# Patient Record
Sex: FEMALE | Race: WHITE | NOT HISPANIC OR LATINO | Employment: OTHER | URBAN - METROPOLITAN AREA
[De-identification: names, ages, dates, MRNs, and addresses within clinical notes are randomized per-mention and may not be internally consistent; named-entity substitution may affect disease eponyms.]

---

## 2017-04-28 ENCOUNTER — APPOINTMENT (OUTPATIENT)
Dept: WOMENS IMAGING | Facility: HOSPITAL | Age: 77
End: 2017-04-28

## 2017-04-28 PROCEDURE — MDREVIEWSP: Performed by: RADIOLOGY

## 2017-04-28 PROCEDURE — 77063 BREAST TOMOSYNTHESIS BI: CPT | Performed by: RADIOLOGY

## 2017-04-28 PROCEDURE — G0202 SCR MAMMO BI INCL CAD: HCPCS | Performed by: RADIOLOGY

## 2017-05-24 ENCOUNTER — TRANSCRIBE ORDERS (OUTPATIENT)
Dept: ADMINISTRATIVE | Facility: HOSPITAL | Age: 77
End: 2017-05-24

## 2017-05-24 DIAGNOSIS — D32.9 MENINGIOMA (HCC): Primary | ICD-10-CM

## 2017-06-01 ENCOUNTER — APPOINTMENT (OUTPATIENT)
Dept: MRI IMAGING | Facility: HOSPITAL | Age: 77
End: 2017-06-01
Attending: INTERNAL MEDICINE

## 2017-06-06 ENCOUNTER — HOSPITAL ENCOUNTER (OUTPATIENT)
Dept: MRI IMAGING | Facility: HOSPITAL | Age: 77
Discharge: HOME | End: 2017-06-06
Attending: INTERNAL MEDICINE

## 2017-06-09 ENCOUNTER — APPOINTMENT (OUTPATIENT)
Dept: MRI IMAGING | Facility: HOSPITAL | Age: 77
End: 2017-06-09
Attending: INTERNAL MEDICINE

## 2017-06-14 ENCOUNTER — HOSPITAL ENCOUNTER (OUTPATIENT)
Dept: MRI IMAGING | Facility: HOSPITAL | Age: 77
Discharge: HOME | End: 2017-06-14
Attending: INTERNAL MEDICINE | Admitting: INTERNAL MEDICINE

## 2017-06-14 DIAGNOSIS — D32.9 MENINGIOMA (HCC): ICD-10-CM

## 2017-06-14 PROCEDURE — 70553 MRI BRAIN STEM W/O & W/DYE: CPT

## 2017-06-14 PROCEDURE — 0 GADOBENATE DIMEGLUMINE 529 MG/ML SOLUTION: Performed by: INTERNAL MEDICINE

## 2017-06-14 PROCEDURE — A9577 INJ MULTIHANCE: HCPCS | Performed by: INTERNAL MEDICINE

## 2017-06-14 PROCEDURE — 82565 ASSAY OF CREATININE: CPT

## 2017-06-14 RX ADMIN — GADOBENATE DIMEGLUMINE 15 ML: 529 INJECTION, SOLUTION INTRAVENOUS at 11:15

## 2017-06-15 LAB — CREAT BLDA-MCNC: 0.8 MG/DL (ref 0.6–1.3)

## 2018-01-09 ENCOUNTER — TRANSCRIBE ORDERS (OUTPATIENT)
Dept: ADMINISTRATIVE | Facility: HOSPITAL | Age: 78
End: 2018-01-09

## 2018-01-09 DIAGNOSIS — D32.9 MENINGIOMA (HCC): Primary | ICD-10-CM

## 2018-01-18 ENCOUNTER — HOSPITAL ENCOUNTER (OUTPATIENT)
Dept: MRI IMAGING | Facility: HOSPITAL | Age: 78
Discharge: HOME | End: 2018-01-18
Attending: INTERNAL MEDICINE | Admitting: INTERNAL MEDICINE

## 2018-01-18 DIAGNOSIS — D32.9 MENINGIOMA (HCC): ICD-10-CM

## 2018-01-18 PROCEDURE — A9577 INJ MULTIHANCE: HCPCS | Performed by: INTERNAL MEDICINE

## 2018-01-18 PROCEDURE — 0 GADOBENATE DIMEGLUMINE 529 MG/ML SOLUTION: Performed by: INTERNAL MEDICINE

## 2018-01-18 PROCEDURE — 82565 ASSAY OF CREATININE: CPT

## 2018-01-18 PROCEDURE — 70553 MRI BRAIN STEM W/O & W/DYE: CPT

## 2018-01-18 RX ADMIN — GADOBENATE DIMEGLUMINE 15 ML: 529 INJECTION, SOLUTION INTRAVENOUS at 11:38

## 2018-01-19 LAB — CREAT BLDA-MCNC: 0.8 MG/DL (ref 0.6–1.3)

## 2018-05-01 ENCOUNTER — APPOINTMENT (OUTPATIENT)
Dept: WOMENS IMAGING | Facility: HOSPITAL | Age: 78
End: 2018-05-01

## 2018-05-01 PROCEDURE — 77067 SCR MAMMO BI INCL CAD: CPT | Performed by: RADIOLOGY

## 2018-05-01 PROCEDURE — MDREVIEWSP: Performed by: RADIOLOGY

## 2018-05-01 PROCEDURE — 77063 BREAST TOMOSYNTHESIS BI: CPT | Performed by: RADIOLOGY

## 2018-06-18 ENCOUNTER — TRANSCRIBE ORDERS (OUTPATIENT)
Dept: ADMINISTRATIVE | Facility: HOSPITAL | Age: 78
End: 2018-06-18

## 2018-06-18 DIAGNOSIS — M54.9 BACK PAIN, UNSPECIFIED BACK LOCATION, UNSPECIFIED BACK PAIN LATERALITY, UNSPECIFIED CHRONICITY: Primary | ICD-10-CM

## 2018-06-22 ENCOUNTER — APPOINTMENT (OUTPATIENT)
Dept: MRI IMAGING | Facility: HOSPITAL | Age: 78
End: 2018-06-22
Attending: INTERNAL MEDICINE

## 2018-06-29 ENCOUNTER — HOSPITAL ENCOUNTER (OUTPATIENT)
Dept: MRI IMAGING | Facility: HOSPITAL | Age: 78
Discharge: HOME | End: 2018-06-29
Attending: INTERNAL MEDICINE | Admitting: INTERNAL MEDICINE

## 2018-06-29 DIAGNOSIS — M54.9 BACK PAIN, UNSPECIFIED BACK LOCATION, UNSPECIFIED BACK PAIN LATERALITY, UNSPECIFIED CHRONICITY: ICD-10-CM

## 2018-06-29 PROCEDURE — 82565 ASSAY OF CREATININE: CPT

## 2018-06-29 PROCEDURE — 72158 MRI LUMBAR SPINE W/O & W/DYE: CPT

## 2018-06-29 PROCEDURE — 0 GADOBENATE DIMEGLUMINE 529 MG/ML SOLUTION: Performed by: INTERNAL MEDICINE

## 2018-06-29 PROCEDURE — A9577 INJ MULTIHANCE: HCPCS | Performed by: INTERNAL MEDICINE

## 2018-06-29 RX ADMIN — GADOBENATE DIMEGLUMINE 15 ML: 529 INJECTION, SOLUTION INTRAVENOUS at 10:38

## 2018-07-03 LAB — CREAT BLDA-MCNC: 0.8 MG/DL (ref 0.6–1.3)

## 2018-07-12 NOTE — PROGRESS NOTES
Subjective   Patient ID: Nai Rutledge is a 78 y.o. female is being seen for consultation today at the request of Miguel Quinones Jr.,* for back pain.    Today the patient is here with a new MRI of the lumbar spine. Today Ms. Rutledge reports back pain that radiates into the right leg down to the foot.    Back Pain   This is a chronic problem. The current episode started more than 1 year ago. The problem occurs daily. The pain is present in the lumbar spine. The pain radiates to the right thigh, right knee and right foot. Associated symptoms include bladder incontinence and leg pain. Pertinent negatives include no bowel incontinence, numbness, tingling or weakness.       The following portions of the patient's history were reviewed and updated as appropriate: allergies, current medications, past family history, past medical history, past social history, past surgical history and problem list.    Review of Systems   Gastrointestinal: Negative for bowel incontinence.   Genitourinary: Positive for bladder incontinence.   Musculoskeletal: Positive for back pain.   Neurological: Negative for tingling, weakness and numbness.   All other systems reviewed and are negative.    This very pleasant lady is with her . She has a history of having a meningioma removed by Dr. Mendoza in 2000 and has been on gabapentin for seizure management. She has had years of off-and-on low back pain and about a year ago began having some pain in her hip and radiating down the leg. Initially there was some concern about this being a hip problem, and she went to see an orthopedic surgeon who did a trochanteric block, which helped for a while, but the pain is now recurring. Her character of her pain is consistent with neurogenic claudication. Sitting down and lying down tend to make it better. Standing and walking tend to make it worse. She has a positive shopping cart sign. We discussed the nature of her problem and her MRI. I think while she  has some mild arthritis of the hip, it is the spinal stenosis that is the main issue here. I recommended a trial of lumbar epidural blocks and conservative treatment for now. If those work we will continue using them, and if not we can consider surgical treatment but we are not at that point yet.       Objective   Physical Exam   Constitutional: She is oriented to person, place, and time. She appears well-developed and well-nourished.   HENT:   Head: Normocephalic and atraumatic.   Eyes: Conjunctivae and EOM are normal. Pupils are equal, round, and reactive to light.   Fundoscopic exam:       The right eye shows no papilledema. The right eye shows venous pulsations.        The left eye shows no papilledema. The left eye shows venous pulsations.   Neck: Carotid bruit is not present.   Neurological: She is oriented to person, place, and time. She has a normal Finger-Nose-Finger Test and a normal Heel to Shin Test. Gait normal.   Reflex Scores:       Tricep reflexes are 2+ on the right side and 2+ on the left side.       Bicep reflexes are 2+ on the right side and 2+ on the left side.       Brachioradialis reflexes are 2+ on the right side and 2+ on the left side.       Patellar reflexes are 2+ on the right side and 2+ on the left side.       Achilles reflexes are 2+ on the right side and 2+ on the left side.  Psychiatric: Her speech is normal.     Neurologic Exam     Mental Status   Oriented to person, place, and time.   Registration of memory: Good recent and remote memory.   Attention: normal. Concentration: normal.   Speech: speech is normal   Level of consciousness: alert  Knowledge: consistent with education.     Cranial Nerves     CN II   Visual fields full to confrontation.   Visual acuity: normal    CN III, IV, VI   Pupils are equal, round, and reactive to light.  Extraocular motions are normal.     CN V   Facial sensation intact.   Right corneal reflex: normal  Left corneal reflex: normal    CN VII   Facial  expression full, symmetric.   Right facial weakness: none  Left facial weakness: none    CN VIII   Hearing: intact    CN IX, X   Palate: symmetric    CN XI   Right sternocleidomastoid strength: normal  Left sternocleidomastoid strength: normal    CN XII   Tongue: not atrophic  Tongue deviation: none    Motor Exam   Muscle bulk: normal  Right arm tone: normal  Left arm tone: normal  Right leg tone: normal  Left leg tone: normal    Strength   Strength 5/5 except as noted.     Sensory Exam   Light touch normal.     Gait, Coordination, and Reflexes     Gait  Gait: normal    Coordination   Finger to nose coordination: normal  Heel to shin coordination: normal    Reflexes   Right brachioradialis: 2+  Left brachioradialis: 2+  Right biceps: 2+  Left biceps: 2+  Right triceps: 2+  Left triceps: 2+  Right patellar: 2+  Left patellar: 2+  Right achilles: 2+  Left achilles: 2+  Right : 2+  Left : 2+      Assessment/Plan   Independent Review of Radiographic Studies:    I reviewed the lumbar MRI done 6/29/18 which does show multilevel degenerative disc disease and a previous surgery on the left at L5-S1 but probably the most important finding is some spinal stenosis at L4-L5 with root compression.  Agree with the report.      Medical Decision Making:    We'll go ahead and get the lumbar epidural blocks and have her come back in 2 months.  Hopefully that will control her symptoms but if all else fails surgical intervention can be considered later on.      Nai was seen today for back pain.    Diagnoses and all orders for this visit:    DDD (degenerative disc disease), lumbar  -     Epidural Block    Spinal stenosis of lumbar region with neurogenic claudication  -     Epidural Block      Return in about 2 months (around 9/16/2018).

## 2018-07-16 ENCOUNTER — OFFICE VISIT (OUTPATIENT)
Dept: NEUROSURGERY | Facility: CLINIC | Age: 78
End: 2018-07-16

## 2018-07-16 VITALS
HEIGHT: 64 IN | BODY MASS INDEX: 25.78 KG/M2 | DIASTOLIC BLOOD PRESSURE: 99 MMHG | WEIGHT: 151 LBS | HEART RATE: 92 BPM | SYSTOLIC BLOOD PRESSURE: 148 MMHG

## 2018-07-16 DIAGNOSIS — M51.36 DDD (DEGENERATIVE DISC DISEASE), LUMBAR: Primary | ICD-10-CM

## 2018-07-16 DIAGNOSIS — M48.062 SPINAL STENOSIS OF LUMBAR REGION WITH NEUROGENIC CLAUDICATION: ICD-10-CM

## 2018-07-16 PROCEDURE — 99204 OFFICE O/P NEW MOD 45 MIN: CPT | Performed by: NEUROLOGICAL SURGERY

## 2018-07-25 ENCOUNTER — ANESTHESIA (OUTPATIENT)
Dept: PAIN MEDICINE | Facility: HOSPITAL | Age: 78
End: 2018-07-25

## 2018-07-25 ENCOUNTER — HOSPITAL ENCOUNTER (OUTPATIENT)
Dept: GENERAL RADIOLOGY | Facility: HOSPITAL | Age: 78
Discharge: HOME | End: 2018-07-25

## 2018-07-25 ENCOUNTER — HOSPITAL ENCOUNTER (OUTPATIENT)
Dept: PAIN MEDICINE | Facility: HOSPITAL | Age: 78
Discharge: HOME | End: 2018-07-25
Attending: NEUROLOGICAL SURGERY | Admitting: ANESTHESIOLOGY

## 2018-07-25 ENCOUNTER — ANESTHESIA EVENT (OUTPATIENT)
Dept: PAIN MEDICINE | Facility: HOSPITAL | Age: 78
End: 2018-07-25

## 2018-07-25 VITALS
TEMPERATURE: 98 F | SYSTOLIC BLOOD PRESSURE: 118 MMHG | HEIGHT: 64 IN | BODY MASS INDEX: 26.8 KG/M2 | OXYGEN SATURATION: 94 % | DIASTOLIC BLOOD PRESSURE: 79 MMHG | RESPIRATION RATE: 16 BRPM | HEART RATE: 65 BPM | WEIGHT: 157 LBS

## 2018-07-25 DIAGNOSIS — R52 PAIN: ICD-10-CM

## 2018-07-25 DIAGNOSIS — M48.062 SPINAL STENOSIS OF LUMBAR REGION WITH NEUROGENIC CLAUDICATION: ICD-10-CM

## 2018-07-25 DIAGNOSIS — M51.36 DDD (DEGENERATIVE DISC DISEASE), LUMBAR: ICD-10-CM

## 2018-07-25 PROCEDURE — 77003 FLUOROGUIDE FOR SPINE INJECT: CPT

## 2018-07-25 PROCEDURE — 25010000002 METHYLPREDNISOLONE PER 80 MG: Performed by: ANESTHESIOLOGY

## 2018-07-25 PROCEDURE — C1755 CATHETER, INTRASPINAL: HCPCS

## 2018-07-25 RX ORDER — LIDOCAINE HYDROCHLORIDE 10 MG/ML
1 INJECTION, SOLUTION INFILTRATION; PERINEURAL ONCE AS NEEDED
Status: DISCONTINUED | OUTPATIENT
Start: 2018-07-25 | End: 2018-07-26 | Stop reason: HOSPADM

## 2018-07-25 RX ORDER — ASPIRIN 81 MG/1
81 TABLET ORAL DAILY
COMMUNITY
End: 2019-11-12

## 2018-07-25 RX ORDER — MIDAZOLAM HYDROCHLORIDE 1 MG/ML
1 INJECTION INTRAMUSCULAR; INTRAVENOUS AS NEEDED
Status: DISCONTINUED | OUTPATIENT
Start: 2018-07-25 | End: 2018-07-26 | Stop reason: HOSPADM

## 2018-07-25 RX ORDER — FENTANYL CITRATE 50 UG/ML
50 INJECTION, SOLUTION INTRAMUSCULAR; INTRAVENOUS AS NEEDED
Status: DISCONTINUED | OUTPATIENT
Start: 2018-07-25 | End: 2018-07-26 | Stop reason: HOSPADM

## 2018-07-25 RX ORDER — METHYLPREDNISOLONE ACETATE 80 MG/ML
80 INJECTION, SUSPENSION INTRA-ARTICULAR; INTRALESIONAL; INTRAMUSCULAR; SOFT TISSUE ONCE
Status: COMPLETED | OUTPATIENT
Start: 2018-07-25 | End: 2018-07-25

## 2018-07-25 RX ORDER — SODIUM CHLORIDE 0.9 % (FLUSH) 0.9 %
1-10 SYRINGE (ML) INJECTION AS NEEDED
Status: DISCONTINUED | OUTPATIENT
Start: 2018-07-25 | End: 2018-07-26 | Stop reason: HOSPADM

## 2018-07-25 RX ADMIN — METHYLPREDNISOLONE ACETATE 80 MG: 80 INJECTION, SUSPENSION INTRA-ARTICULAR; INTRALESIONAL; INTRAMUSCULAR; SOFT TISSUE at 10:12

## 2018-07-25 NOTE — ANESTHESIA PROCEDURE NOTES
PAIN Epidural block    Patient location during procedure: pain clinic  Start Time: 7/25/2018 10:10 AM  Stop Time: 7/25/2018 10:14 AM  Indication:at surgeon's request and procedure for pain  Performed By  Anesthesiologist: NOREEN BRASHER  Preanesthetic Checklist  Completed: patient identified, site marked, surgical consent, pre-op evaluation, timeout performed, IV checked, risks and benefits discussed and monitors and equipment checked  Additional Notes  Post-Op Diagnosis Codes:     * Lumbar degenerative disc disease (M51.36)     * Lumbar spinal stenosis (M48.061)  Prep:  Pt Position:prone  Sterile Tech:cap, gloves, mask and sterile barrier  Prep:chlorhexidine gluconate and isopropyl alcohol  Monitoring:blood pressure monitoring, continuous pulse oximetry and EKG  Procedure:  Sedation: no   Approach:left paramedian  Guidance: fluoroscopy  Location:lumbar  Level:5-6  Needle Type:Morelia  Needle Gauge:17 G  Cath Depth at skin:7 cm  Aspiration:negative  Test Dose:negative  Medications:  Depomedrol:80 mg  Preservative Free Saline:4mL    Post Assessment:  Dressing:occlusive dressing applied  Pt Tolerance:patient tolerated the procedure well with no apparent complications  Complications:no

## 2018-07-25 NOTE — INTERVAL H&P NOTE
[facility]  H&P reviewed. No changes since last visit.  Patient states   0% improvement since the last procedure/injection.    Diagnosis     * Lumbar degenerative disc disease [M51.36]     * Lumbar spinal stenosis [M48.061] First block. Right sided pain      Airway assessed since last visit. Airway class equals: 2.

## 2018-07-25 NOTE — H&P (VIEW-ONLY)
Subjective   Patient ID: Nai Rutledge is a 78 y.o. female is being seen for consultation today at the request of Miguel Quinones Jr.,* for back pain.    Today the patient is here with a new MRI of the lumbar spine. Today Ms. Rultedge reports back pain that radiates into the right leg down to the foot.    Back Pain   This is a chronic problem. The current episode started more than 1 year ago. The problem occurs daily. The pain is present in the lumbar spine. The pain radiates to the right thigh, right knee and right foot. Associated symptoms include bladder incontinence and leg pain. Pertinent negatives include no bowel incontinence, numbness, tingling or weakness.       The following portions of the patient's history were reviewed and updated as appropriate: allergies, current medications, past family history, past medical history, past social history, past surgical history and problem list.    Review of Systems   Gastrointestinal: Negative for bowel incontinence.   Genitourinary: Positive for bladder incontinence.   Musculoskeletal: Positive for back pain.   Neurological: Negative for tingling, weakness and numbness.   All other systems reviewed and are negative.    This very pleasant lady is with her . She has a history of having a meningioma removed by Dr. Mendoza in 2000 and has been on gabapentin for seizure management. She has had years of off-and-on low back pain and about a year ago began having some pain in her hip and radiating down the leg. Initially there was some concern about this being a hip problem, and she went to see an orthopedic surgeon who did a trochanteric block, which helped for a while, but the pain is now recurring. Her character of her pain is consistent with neurogenic claudication. Sitting down and lying down tend to make it better. Standing and walking tend to make it worse. She has a positive shopping cart sign. We discussed the nature of her problem and her MRI. I think while she  has some mild arthritis of the hip, it is the spinal stenosis that is the main issue here. I recommended a trial of lumbar epidural blocks and conservative treatment for now. If those work we will continue using them, and if not we can consider surgical treatment but we are not at that point yet.       Objective   Physical Exam   Constitutional: She is oriented to person, place, and time. She appears well-developed and well-nourished.   HENT:   Head: Normocephalic and atraumatic.   Eyes: Conjunctivae and EOM are normal. Pupils are equal, round, and reactive to light.   Fundoscopic exam:       The right eye shows no papilledema. The right eye shows venous pulsations.        The left eye shows no papilledema. The left eye shows venous pulsations.   Neck: Carotid bruit is not present.   Neurological: She is oriented to person, place, and time. She has a normal Finger-Nose-Finger Test and a normal Heel to Shin Test. Gait normal.   Reflex Scores:       Tricep reflexes are 2+ on the right side and 2+ on the left side.       Bicep reflexes are 2+ on the right side and 2+ on the left side.       Brachioradialis reflexes are 2+ on the right side and 2+ on the left side.       Patellar reflexes are 2+ on the right side and 2+ on the left side.       Achilles reflexes are 2+ on the right side and 2+ on the left side.  Psychiatric: Her speech is normal.     Neurologic Exam     Mental Status   Oriented to person, place, and time.   Registration of memory: Good recent and remote memory.   Attention: normal. Concentration: normal.   Speech: speech is normal   Level of consciousness: alert  Knowledge: consistent with education.     Cranial Nerves     CN II   Visual fields full to confrontation.   Visual acuity: normal    CN III, IV, VI   Pupils are equal, round, and reactive to light.  Extraocular motions are normal.     CN V   Facial sensation intact.   Right corneal reflex: normal  Left corneal reflex: normal    CN VII   Facial  expression full, symmetric.   Right facial weakness: none  Left facial weakness: none    CN VIII   Hearing: intact    CN IX, X   Palate: symmetric    CN XI   Right sternocleidomastoid strength: normal  Left sternocleidomastoid strength: normal    CN XII   Tongue: not atrophic  Tongue deviation: none    Motor Exam   Muscle bulk: normal  Right arm tone: normal  Left arm tone: normal  Right leg tone: normal  Left leg tone: normal    Strength   Strength 5/5 except as noted.     Sensory Exam   Light touch normal.     Gait, Coordination, and Reflexes     Gait  Gait: normal    Coordination   Finger to nose coordination: normal  Heel to shin coordination: normal    Reflexes   Right brachioradialis: 2+  Left brachioradialis: 2+  Right biceps: 2+  Left biceps: 2+  Right triceps: 2+  Left triceps: 2+  Right patellar: 2+  Left patellar: 2+  Right achilles: 2+  Left achilles: 2+  Right : 2+  Left : 2+      Assessment/Plan   Independent Review of Radiographic Studies:    I reviewed the lumbar MRI done 6/29/18 which does show multilevel degenerative disc disease and a previous surgery on the left at L5-S1 but probably the most important finding is some spinal stenosis at L4-L5 with root compression.  Agree with the report.      Medical Decision Making:    We'll go ahead and get the lumbar epidural blocks and have her come back in 2 months.  Hopefully that will control her symptoms but if all else fails surgical intervention can be considered later on.      Nai was seen today for back pain.    Diagnoses and all orders for this visit:    DDD (degenerative disc disease), lumbar  -     Epidural Block    Spinal stenosis of lumbar region with neurogenic claudication  -     Epidural Block      Return in about 2 months (around 9/16/2018).

## 2018-08-23 ENCOUNTER — APPOINTMENT (OUTPATIENT)
Dept: PAIN MEDICINE | Facility: HOSPITAL | Age: 78
End: 2018-08-23

## 2018-09-05 NOTE — PROGRESS NOTES
Subjective   Patient ID: Nai Rutledge is a 78 y.o. female is here today for follow-up on back pain.    At the last visit the patient reported back pain that radiates into the right leg down to the foot. The patient was referred to pain management for epidurals at the last visit.    Today the patient reports she has had 1 CLARENCE with good relief. Her pain has returned. Her pain is worse in the morning and as the day progresses it gets better. Her last CLARENCE was 7/25/2018. She is having some muscle pain in her back.    Back Pain   This is a chronic problem. The current episode started more than 1 year ago. The problem occurs daily. The problem has been gradually improving (Pain is returning) since onset. The pain is present in the lumbar spine. The pain radiates to the right thigh, right knee and right foot. The pain is at a severity of 5/10. The pain is mild. Worse during: Worse in morning. The symptoms are aggravated by sitting. Associated symptoms include bladder incontinence ( She has had this for a few months) and leg pain. Pertinent negatives include no bowel incontinence, chest pain, numbness, tingling or weakness. Treatments tried: CLARENCE x 1. The treatment provided mild relief.       The following portions of the patient's history were reviewed and updated as appropriate: allergies, current medications, past family history, past medical history, past social history, past surgical history and problem list.    Review of Systems   Respiratory: Negative for chest tightness and shortness of breath.    Cardiovascular: Negative for chest pain.   Gastrointestinal: Negative for bowel incontinence.   Genitourinary: Positive for bladder incontinence ( She has had this for a few months).   Musculoskeletal: Positive for back pain and myalgias.   Neurological: Negative for tingling, weakness and numbness.   All other systems reviewed and are negative.    This patient has known lumbar spinal stenosis with right buttock and leg pain  consistent with neurogenic claudication. She has had a previous history of a meningioma removed by Dr. Nava years ago and remains on gabapentin at 600 mg t.i.d. largely for seizures. She had 1 block in late October and it helped, but the pain has started to recur. She is aware of the Medicare guidelines for 4 blocks per year and would like to space them out in an attempt to avoid surgical intervention. That means she would need to wait until late October to get another block, which she is fine with doing, and repeating it again in late January. Only if the radicular pain becomes intractable and blocks do not help would I consider a lumbar decompression at L4-L5.       Objective   Physical Exam   Constitutional: She is oriented to person, place, and time. She appears well-developed and well-nourished.   HENT:   Head: Normocephalic and atraumatic.   Eyes: Pupils are equal, round, and reactive to light. Conjunctivae and EOM are normal.   Fundoscopic exam:       The right eye shows no papilledema. The right eye shows venous pulsations.        The left eye shows no papilledema. The left eye shows venous pulsations.   Neck: Carotid bruit is not present.   Neurological: She is oriented to person, place, and time. She has a normal Finger-Nose-Finger Test and a normal Heel to Shin Test. Gait normal.   Reflex Scores:       Tricep reflexes are 2+ on the right side and 2+ on the left side.       Bicep reflexes are 2+ on the right side and 2+ on the left side.       Brachioradialis reflexes are 2+ on the right side and 2+ on the left side.       Patellar reflexes are 2+ on the right side and 2+ on the left side.       Achilles reflexes are 2+ on the right side and 2+ on the left side.  Psychiatric: Her speech is normal.     Neurologic Exam     Mental Status   Oriented to person, place, and time.   Registration of memory: Good recent and remote memory.   Attention: normal. Concentration: normal.   Speech: speech is normal    Level of consciousness: alert  Knowledge: consistent with education.     Cranial Nerves     CN II   Visual fields full to confrontation.   Visual acuity: normal    CN III, IV, VI   Pupils are equal, round, and reactive to light.  Extraocular motions are normal.     CN V   Facial sensation intact.   Right corneal reflex: normal  Left corneal reflex: normal    CN VII   Facial expression full, symmetric.   Right facial weakness: none  Left facial weakness: none    CN VIII   Hearing: intact    CN IX, X   Palate: symmetric    CN XI   Right sternocleidomastoid strength: normal  Left sternocleidomastoid strength: normal    CN XII   Tongue: not atrophic  Tongue deviation: none    Motor Exam   Muscle bulk: normal  Right arm tone: normal  Left arm tone: normal  Right leg tone: normal  Left leg tone: normal    Strength   Strength 5/5 except as noted.     Sensory Exam   Light touch normal.     Gait, Coordination, and Reflexes     Gait  Gait: normal    Coordination   Finger to nose coordination: normal  Heel to shin coordination: normal    Reflexes   Right brachioradialis: 2+  Left brachioradialis: 2+  Right biceps: 2+  Left biceps: 2+  Right triceps: 2+  Left triceps: 2+  Right patellar: 2+  Left patellar: 2+  Right achilles: 2+  Left achilles: 2+  Right : 2+  Left : 2+      Assessment/Plan   Independent Review of Radiographic Studies:    I reviewed the lumbar MRI done June 29, 2018 which shows spinal stenosis at L4-L5 and previous surgery on the left at L5-S1.  Agree with the report.      Medical Decision Making:    We'll stick with nonoperative treatment.  She will get another block in late October and another block in late January and I'll see her in about 5 months.  If blocks don't help with the benefit is to brief, we can discuss surgical treatment but I don't think were at that point yet.      Nai was seen today for back pain.    Diagnoses and all orders for this visit:    DDD (degenerative disc disease),  lumbar  -     Epidural Block    Spinal stenosis of lumbar region with neurogenic claudication  -     Epidural Block      Return in about 5 months (around 2/17/2019).

## 2018-09-17 ENCOUNTER — OFFICE VISIT (OUTPATIENT)
Dept: NEUROSURGERY | Facility: CLINIC | Age: 78
End: 2018-09-17

## 2018-09-17 VITALS — SYSTOLIC BLOOD PRESSURE: 129 MMHG | HEART RATE: 90 BPM | DIASTOLIC BLOOD PRESSURE: 92 MMHG

## 2018-09-17 DIAGNOSIS — M51.36 DDD (DEGENERATIVE DISC DISEASE), LUMBAR: Primary | ICD-10-CM

## 2018-09-17 DIAGNOSIS — M48.062 SPINAL STENOSIS OF LUMBAR REGION WITH NEUROGENIC CLAUDICATION: ICD-10-CM

## 2018-09-17 PROCEDURE — 99214 OFFICE O/P EST MOD 30 MIN: CPT | Performed by: NEUROLOGICAL SURGERY

## 2018-11-06 ENCOUNTER — HOSPITAL ENCOUNTER (OUTPATIENT)
Dept: PAIN MEDICINE | Facility: HOSPITAL | Age: 78
Discharge: HOME | End: 2018-11-06
Admitting: ANESTHESIOLOGY

## 2018-11-06 ENCOUNTER — ANESTHESIA EVENT (OUTPATIENT)
Dept: PAIN MEDICINE | Facility: HOSPITAL | Age: 78
End: 2018-11-06

## 2018-11-06 ENCOUNTER — ANESTHESIA (OUTPATIENT)
Dept: PAIN MEDICINE | Facility: HOSPITAL | Age: 78
End: 2018-11-06

## 2018-11-06 ENCOUNTER — HOSPITAL ENCOUNTER (OUTPATIENT)
Dept: GENERAL RADIOLOGY | Facility: HOSPITAL | Age: 78
Discharge: HOME | End: 2018-11-06

## 2018-11-06 VITALS
TEMPERATURE: 98.1 F | HEIGHT: 64 IN | OXYGEN SATURATION: 95 % | DIASTOLIC BLOOD PRESSURE: 80 MMHG | BODY MASS INDEX: 27.31 KG/M2 | SYSTOLIC BLOOD PRESSURE: 118 MMHG | WEIGHT: 160 LBS | RESPIRATION RATE: 16 BRPM | HEART RATE: 60 BPM

## 2018-11-06 DIAGNOSIS — M48.062 SPINAL STENOSIS OF LUMBAR REGION WITH NEUROGENIC CLAUDICATION: ICD-10-CM

## 2018-11-06 DIAGNOSIS — M51.36 DDD (DEGENERATIVE DISC DISEASE), LUMBAR: Primary | ICD-10-CM

## 2018-11-06 DIAGNOSIS — R52 PAIN: ICD-10-CM

## 2018-11-06 PROCEDURE — 25010000002 METHYLPREDNISOLONE PER 80 MG: Performed by: ANESTHESIOLOGY

## 2018-11-06 PROCEDURE — 77003 FLUOROGUIDE FOR SPINE INJECT: CPT

## 2018-11-06 PROCEDURE — C1755 CATHETER, INTRASPINAL: HCPCS

## 2018-11-06 RX ORDER — MIDAZOLAM HYDROCHLORIDE 1 MG/ML
1 INJECTION INTRAMUSCULAR; INTRAVENOUS AS NEEDED
Status: DISCONTINUED | OUTPATIENT
Start: 2018-11-06 | End: 2018-11-07 | Stop reason: HOSPADM

## 2018-11-06 RX ORDER — LIDOCAINE HYDROCHLORIDE 10 MG/ML
1 INJECTION, SOLUTION INFILTRATION; PERINEURAL ONCE AS NEEDED
Status: DISCONTINUED | OUTPATIENT
Start: 2018-11-06 | End: 2018-11-07 | Stop reason: HOSPADM

## 2018-11-06 RX ORDER — SODIUM CHLORIDE 0.9 % (FLUSH) 0.9 %
1-10 SYRINGE (ML) INJECTION AS NEEDED
Status: DISCONTINUED | OUTPATIENT
Start: 2018-11-06 | End: 2018-11-07 | Stop reason: HOSPADM

## 2018-11-06 RX ORDER — METHYLPREDNISOLONE ACETATE 80 MG/ML
80 INJECTION, SUSPENSION INTRA-ARTICULAR; INTRALESIONAL; INTRAMUSCULAR; SOFT TISSUE ONCE
Status: COMPLETED | OUTPATIENT
Start: 2018-11-06 | End: 2018-11-06

## 2018-11-06 RX ORDER — FENTANYL CITRATE 50 UG/ML
50 INJECTION, SOLUTION INTRAMUSCULAR; INTRAVENOUS AS NEEDED
Status: DISCONTINUED | OUTPATIENT
Start: 2018-11-06 | End: 2018-11-07 | Stop reason: HOSPADM

## 2018-11-06 RX ADMIN — METHYLPREDNISOLONE ACETATE 80 MG: 80 INJECTION, SUSPENSION INTRA-ARTICULAR; INTRALESIONAL; INTRAMUSCULAR; SOFT TISSUE at 13:05

## 2018-11-06 NOTE — ANESTHESIA PROCEDURE NOTES
PAIN Epidural block    Pre-sedation assessment completed: 11/6/2018 12:54 PM    Patient reassessed immediately prior to procedure    Patient location during procedure: pain clinic  Indication:procedure for pain  Performed By  Anesthesiologist: LUISITO LÓPEZ  Preanesthetic Checklist  Completed: patient identified, site marked, surgical consent, pre-op evaluation, timeout performed, IV checked, risks and benefits discussed and monitors and equipment checked  Additional Notes  Post-Op Diagnosis Codes:     * Lumbar degenerative disc disease (M51.36)     * Lumbar spinal stenosis (M48.061)  Performed under fluoroscopic guidance  Prep:  Pt Position:prone  Sterile Tech:cap, gloves, mask and sterile barrier  Prep:chlorhexidine gluconate and isopropyl alcohol  Monitoring:blood pressure monitoring, continuous pulse oximetry and EKG  Procedure:  Sedation: no   Approach:right paramedian  Guidance: fluoroscopy  Location:lumbar (Intralaminar)  Interspace: L5-S1, Unable to find loss of resistance at L4-5.  Needle Type:Tuohy  Needle Gauge:20  Aspiration:negative  Medications:  Depomedrol:80  Preservative Free Saline:4mL  Comments:No contrast due to allergies.  Post Assessment:  Post-procedure: Band-aid.  Pt Tolerance:patient tolerated the procedure well with no apparent complications  Complications:no

## 2018-11-06 NOTE — H&P
[facility]    History and Physical    Patient Name: Nai Rutledge  :  1940  MRN:  [id number]  Date of Admission: 2018    Subjective     Patient is a 78 y.o. female presents with chief complaint of chronic low back and leg: right pain.  She has had one epidural in this series and notes almost 80% improvement of the leg avelina, less improvement of the back pain. .  She has a history of surgery at L5-S1 and stenosis worse at L4-5.    The following portions of the patients history were reviewed and updated as appropriate: current medications, allergies, past medical history, past surgical history, past family history, past social history and problem list                Objective     Past Medical History:   Past Medical History:   Diagnosis Date   • Back pain    • Cataract    • Hyperlipidemia    • Low back pain    • Meningioma (CMS/HCC)    • Seizures (CMS/HCC)    • Stroke (CMS/HCC)      Past Surgical History:   Past Surgical History:   Procedure Laterality Date   • BRAIN SURGERY      MENINGIOMA, BLEEDING, SEIZURE, STOKE   • BREAST SURGERY     • CATARACT EXTRACTION       AND    • EYE SURGERY Right     MACULAR HOLE IN RETINA     Family History:   Family History   Problem Relation Age of Onset   • No Known Problems Mother    • Heart disease Father    • Lung cancer Sister      Social History:   Social History   Substance Use Topics   • Smoking status: Never Smoker   • Smokeless tobacco: Never Used   • Alcohol use 6.0 oz/week     10 Glasses of wine per week       Vital Signs Range for the last 24 hours  Temperature: Temp:  [36.7 °C (98.1 °F)] 36.7 °C (98.1 °F)   Temp Source: Temp src: Oral   BP: BP: (146)/(95) 146/95   Pulse: Heart Rate:  [5] 5   Respirations: Resp:  [16] 16   SPO2: SpO2:  [94 %] 94 %   O2 Amount (l/min):     O2 Devices Device (Oxygen Therapy): room air   Weight: Weight:  [72.6 kg (160 lb)] 72.6 kg (160 lb)     Flowsheet Rows      First Filed Value   Admission Height  162.6  "cm (64\") Documented at 11/06/2018 1232   Admission Weight  72.6 kg (160 lb) Documented at 11/06/2018 1232          --------------------------------------------------------------------------------    Current Outpatient Prescriptions   Medication Sig Dispense Refill   • atorvastatin (LIPITOR) 20 MG tablet Take 20 mg by mouth Daily.     • Cholecalciferol (VITAMIN D3) 2000 units tablet Take  by mouth.     • gabapentin (NEURONTIN) 600 MG tablet Take 600 mg by mouth 3 (Three) Times a Day.     • Multiple Vitamins-Minerals (MULTIVITAMIN WITH MINERALS) tablet tablet Take 1 tablet by mouth Daily.     • vitamin B-12 (CYANOCOBALAMIN) 1000 MCG tablet Take 1,000 mcg by mouth Daily.     • aspirin 81 MG EC tablet Take 81 mg by mouth Daily.     • Omega-3 Fatty Acids (FISH OIL) 1000 MG capsule capsule Take  by mouth Daily With Breakfast.       No current facility-administered medications for this encounter.        --------------------------------------------------------------------------------  Assessment/Plan      Anesthesia Evaluation                  Airway   Mallampati: II  Dental - normal exam     Pulmonary    Cardiovascular - normal exam        Neuro/Psych- neuro exam normal  (-) strength not normal in all 4 extremities, left straight leg raise test, right straight leg raise test  GI/Hepatic/Renal/Endo      Musculoskeletal     Abdominal    Substance History      OB/GYN          Other                   Diagnosis and Plan    Treatment Plan  ASA 2   Patient has had previous injection/procedure with % improvement.   Procedures: Lumbar Epidural Steroid Injection(LESI), With fluoroscopy,       Anesthetic plan and risks discussed with patient.          Diagnosis     * Lumbar degenerative disc disease [M51.36]     * Lumbar spinal stenosis [M48.061]                    "

## 2019-01-23 ENCOUNTER — TRANSCRIBE ORDERS (OUTPATIENT)
Dept: ADMINISTRATIVE | Facility: HOSPITAL | Age: 79
End: 2019-01-23

## 2019-01-23 DIAGNOSIS — D32.9 MENINGIOMA (HCC): Primary | ICD-10-CM

## 2019-02-06 ENCOUNTER — HOSPITAL ENCOUNTER (OUTPATIENT)
Dept: MRI IMAGING | Facility: HOSPITAL | Age: 79
Discharge: HOME | End: 2019-02-06
Attending: INTERNAL MEDICINE | Admitting: INTERNAL MEDICINE

## 2019-02-06 DIAGNOSIS — D32.9 MENINGIOMA (HCC): ICD-10-CM

## 2019-02-06 LAB — CREAT BLDA-MCNC: 0.7 MG/DL (ref 0.6–1.3)

## 2019-02-06 PROCEDURE — 82565 ASSAY OF CREATININE: CPT

## 2019-02-06 PROCEDURE — 0 GADOBENATE DIMEGLUMINE 529 MG/ML SOLUTION: Performed by: INTERNAL MEDICINE

## 2019-02-06 PROCEDURE — A9577 INJ MULTIHANCE: HCPCS | Performed by: INTERNAL MEDICINE

## 2019-02-06 PROCEDURE — 70553 MRI BRAIN STEM W/O & W/DYE: CPT

## 2019-02-06 RX ADMIN — GADOBENATE DIMEGLUMINE 15 ML: 529 INJECTION, SOLUTION INTRAVENOUS at 11:20

## 2019-02-12 NOTE — PROGRESS NOTES
Subjective   Patient ID: Nai Rutledge is a 78 y.o. female is here today for follow-up after CLARENCE x 1 which has helped.  She is scheduled for her next one in mid March.  Patient was last seen 9.17.18 for back and right leg pain.    She is currently having intermittent mild to moderate low back pain with prolonged walking.  No leg pain or weakness and no N/T.  She is taking Gabapentin 600mg TID.        Back Pain   The problem occurs intermittently. The problem has been gradually improving since onset. The pain is present in the lumbar spine. The pain does not radiate. The pain is at a severity of 3/10. The pain is mild. Pertinent negatives include no leg pain, numbness, tingling or weakness.       The following portions of the patient's history were reviewed and updated as appropriate: allergies, current medications, past family history, past medical history, past social history, past surgical history and problem list.    Review of Systems   Musculoskeletal: Positive for back pain. Negative for arthralgias.   Neurological: Negative for tingling, weakness and numbness.   All other systems reviewed and are negative.    It has been about 5 months since I have seen her. She had a block in 11/2018 and it seemed to help her. She has back and right leg pain from spinal stenosis. The block actually helped her leg pain and what is bothering her most is the residual back pain. She had not been utilizing her membership at [facility] so I told her to go back and get in the therapy pool and to walk and do some mild exercises, including the recumbent bicycle and maybe even the seated elliptical . She said her back tends to bother her when she is standing upright for a long time so I told her that she can use a Warm 'N Form brace which I gave her a prescription for, when she anticipates being up and about. We will schedule the epidural blocks in 06/2019 and 09/2019, and have her come back after that. Again, we are trying to  avoid surgery if at all possible for her spinal stenosis.          Objective   Physical Exam   Constitutional: She is oriented to person, place, and time. She appears well-developed and well-nourished.   HENT:   Head: Normocephalic and atraumatic.   Eyes: Conjunctivae and EOM are normal. Pupils are equal, round, and reactive to light.   Fundoscopic exam:       The right eye shows no papilledema. The right eye shows venous pulsations.        The left eye shows no papilledema. The left eye shows venous pulsations.   Neck: Carotid bruit is not present.   Neurological: She is oriented to person, place, and time. She has a normal Finger-Nose-Finger Test and a normal Heel to Shin Test. Gait normal.   Reflex Scores:       Tricep reflexes are 2+ on the right side and 2+ on the left side.       Bicep reflexes are 2+ on the right side and 2+ on the left side.       Brachioradialis reflexes are 2+ on the right side and 2+ on the left side.       Patellar reflexes are 2+ on the right side and 2+ on the left side.       Achilles reflexes are 2+ on the right side and 2+ on the left side.  Psychiatric: Her speech is normal.     Neurologic Exam     Mental Status   Oriented to person, place, and time.   Registration of memory: Good recent and remote memory.   Attention: normal. Concentration: normal.   Speech: speech is normal   Level of consciousness: alert  Knowledge: consistent with education.     Cranial Nerves     CN II   Visual fields full to confrontation.   Visual acuity: normal    CN III, IV, VI   Pupils are equal, round, and reactive to light.  Extraocular motions are normal.     CN V   Facial sensation intact.   Right corneal reflex: normal  Left corneal reflex: normal    CN VII   Facial expression full, symmetric.   Right facial weakness: none  Left facial weakness: none    CN VIII   Hearing: intact    CN IX, X   Palate: symmetric    CN XI   Right sternocleidomastoid strength: normal  Left sternocleidomastoid strength:  normal    CN XII   Tongue: not atrophic  Tongue deviation: none    Motor Exam   Muscle bulk: normal  Right arm tone: normal  Left arm tone: normal  Right leg tone: normal  Left leg tone: normal    Strength   Strength 5/5 except as noted.     Sensory Exam   Light touch normal.     Gait, Coordination, and Reflexes     Gait  Gait: normal    Coordination   Finger to nose coordination: normal  Heel to shin coordination: normal    Reflexes   Right brachioradialis: 2+  Left brachioradialis: 2+  Right biceps: 2+  Left biceps: 2+  Right triceps: 2+  Left triceps: 2+  Right patellar: 2+  Left patellar: 2+  Right achilles: 2+  Left achilles: 2+  Right : 2+  Left : 2+      Assessment/Plan   Independent Review of Radiographic Studies:    Lumbar MRI done 6/29/18 shows spinal stenosis at L4-L5 with postoperative [place] the left at L5-S1.  Agree with the report.      Medical Decision Making:    We'll stick with nonoperative approach.  We'll get another block in June and another in September with a follow-up in 10 months.  I gave her prescription for a Warm 'n Form brace and told to go back to [facility] and do exercises in the therapeutic pool.      Nai was seen today for back pain.    Diagnoses and all orders for this visit:    DDD (degenerative disc disease), lumbar  -     Epidural Block    Spinal stenosis of lumbar region with neurogenic claudication  -     Epidural Block      Return in about 8 months (around 10/18/2019).

## 2019-02-18 ENCOUNTER — OFFICE VISIT (OUTPATIENT)
Dept: NEUROSURGERY | Facility: CLINIC | Age: 79
End: 2019-02-18

## 2019-02-18 VITALS
DIASTOLIC BLOOD PRESSURE: 90 MMHG | BODY MASS INDEX: 26.46 KG/M2 | WEIGHT: 155 LBS | SYSTOLIC BLOOD PRESSURE: 131 MMHG | HEART RATE: 80 BPM | HEIGHT: 64 IN

## 2019-02-18 DIAGNOSIS — M51.36 DDD (DEGENERATIVE DISC DISEASE), LUMBAR: Primary | ICD-10-CM

## 2019-02-18 DIAGNOSIS — M48.062 SPINAL STENOSIS OF LUMBAR REGION WITH NEUROGENIC CLAUDICATION: ICD-10-CM

## 2019-02-18 PROCEDURE — 99213 OFFICE O/P EST LOW 20 MIN: CPT | Performed by: NEUROLOGICAL SURGERY

## 2019-03-07 ENCOUNTER — TRANSCRIBE ORDERS (OUTPATIENT)
Dept: ADMINISTRATIVE | Facility: HOSPITAL | Age: 79
End: 2019-03-07

## 2019-03-07 DIAGNOSIS — I48.0 PAROXYSMAL ATRIAL FIBRILLATION (HCC): Primary | ICD-10-CM

## 2019-03-12 ENCOUNTER — HOSPITAL ENCOUNTER (OUTPATIENT)
Dept: CARDIOLOGY | Facility: HOSPITAL | Age: 79
Discharge: HOME | End: 2019-03-12
Admitting: INTERNAL MEDICINE

## 2019-03-12 DIAGNOSIS — I48.0 PAROXYSMAL ATRIAL FIBRILLATION (HCC): ICD-10-CM

## 2019-03-12 PROCEDURE — 0296T HC EXT ECG > 48HR TO 21 DAY RCRD W/CONECT INTL RCRD: CPT

## 2019-03-18 ENCOUNTER — ANESTHESIA (OUTPATIENT)
Dept: PAIN MEDICINE | Facility: HOSPITAL | Age: 79
End: 2019-03-18

## 2019-03-18 ENCOUNTER — ANESTHESIA EVENT (OUTPATIENT)
Dept: PAIN MEDICINE | Facility: HOSPITAL | Age: 79
End: 2019-03-18

## 2019-03-18 ENCOUNTER — HOSPITAL ENCOUNTER (OUTPATIENT)
Dept: GENERAL RADIOLOGY | Facility: HOSPITAL | Age: 79
Discharge: HOME | End: 2019-03-18

## 2019-03-18 ENCOUNTER — HOSPITAL ENCOUNTER (OUTPATIENT)
Dept: PAIN MEDICINE | Facility: HOSPITAL | Age: 79
Discharge: HOME | End: 2019-03-18
Admitting: ANESTHESIOLOGY

## 2019-03-18 VITALS
OXYGEN SATURATION: 94 % | HEART RATE: 59 BPM | SYSTOLIC BLOOD PRESSURE: 115 MMHG | TEMPERATURE: 97.5 F | RESPIRATION RATE: 16 BRPM | DIASTOLIC BLOOD PRESSURE: 67 MMHG

## 2019-03-18 DIAGNOSIS — M51.36 DDD (DEGENERATIVE DISC DISEASE), LUMBAR: Primary | ICD-10-CM

## 2019-03-18 DIAGNOSIS — R52 PAIN: ICD-10-CM

## 2019-03-18 DIAGNOSIS — M48.062 SPINAL STENOSIS OF LUMBAR REGION WITH NEUROGENIC CLAUDICATION: ICD-10-CM

## 2019-03-18 PROCEDURE — 25010000002 METHYLPREDNISOLONE PER 80 MG: Performed by: ANESTHESIOLOGY

## 2019-03-18 PROCEDURE — C1755 CATHETER, INTRASPINAL: HCPCS

## 2019-03-18 PROCEDURE — 77003 FLUOROGUIDE FOR SPINE INJECT: CPT

## 2019-03-18 RX ORDER — FENTANYL CITRATE 50 UG/ML
50 INJECTION, SOLUTION INTRAMUSCULAR; INTRAVENOUS AS NEEDED
Status: DISCONTINUED | OUTPATIENT
Start: 2019-03-18 | End: 2019-03-19 | Stop reason: HOSPADM

## 2019-03-18 RX ORDER — MIDAZOLAM HYDROCHLORIDE 1 MG/ML
1 INJECTION INTRAMUSCULAR; INTRAVENOUS AS NEEDED
Status: DISCONTINUED | OUTPATIENT
Start: 2019-03-18 | End: 2019-03-19 | Stop reason: HOSPADM

## 2019-03-18 RX ORDER — SODIUM CHLORIDE 0.9 % (FLUSH) 0.9 %
1-10 SYRINGE (ML) INJECTION AS NEEDED
Status: DISCONTINUED | OUTPATIENT
Start: 2019-03-18 | End: 2019-03-19 | Stop reason: HOSPADM

## 2019-03-18 RX ORDER — METHYLPREDNISOLONE ACETATE 80 MG/ML
80 INJECTION, SUSPENSION INTRA-ARTICULAR; INTRALESIONAL; INTRAMUSCULAR; SOFT TISSUE ONCE
Status: COMPLETED | OUTPATIENT
Start: 2019-03-18 | End: 2019-03-18

## 2019-03-18 RX ORDER — LIDOCAINE HYDROCHLORIDE 10 MG/ML
1 INJECTION, SOLUTION INFILTRATION; PERINEURAL ONCE AS NEEDED
Status: DISCONTINUED | OUTPATIENT
Start: 2019-03-18 | End: 2019-03-19 | Stop reason: HOSPADM

## 2019-03-18 RX ADMIN — METHYLPREDNISOLONE ACETATE 80 MG: 80 INJECTION, SUSPENSION INTRA-ARTICULAR; INTRALESIONAL; INTRAMUSCULAR; SOFT TISSUE at 11:19

## 2019-03-18 NOTE — H&P
[facility]    History and Physical    Patient Name: Nai Rutledge  :  1940  MRN:  [id number]  Date of Admission: 3/18/2019    Subjective     Patient is a 78 y.o. female presents with chief complaint of chronic low back and leg: right pain.  She has had two epidurals in this series, the last in November and notes almost 100% improvement of the leg pain, and at least 50% improvement of the back pain. .  She has a history of left sided surgery at L5-S1 and spinal stenosis worse at L4-5.    The following portions of the patients history were reviewed and updated as appropriate: current medications, allergies, past medical history, past surgical history, past family history, past social history and problem list                Objective     Past Medical History:   Past Medical History:   Diagnosis Date   • Back pain    • Cataract    • Hyperlipidemia    • Low back pain    • Meningioma (CMS/[facility])    • Seizures (CMS/[facility])    • Stroke (CMS/[facility])      Past Surgical History:   Past Surgical History:   Procedure Laterality Date   • BRAIN SURGERY      MENINGIOMA, BLEEDING, SEIZURE, STOKE   • BREAST SURGERY     • CATARACT EXTRACTION       AND    • EPIDURAL BLOCK     • EYE SURGERY Right     MACULAR HOLE IN RETINA     Family History:   Family History   Problem Relation Age of Onset   • No Known Problems Mother    • Heart disease Father    • Lung cancer Sister      Social History:   Social History     Tobacco Use   • Smoking status: Never Smoker   • Smokeless tobacco: Never Used   Substance Use Topics   • Alcohol use: Yes     Alcohol/week: 6.0 oz     Types: 10 Glasses of wine per week   • Drug use: No       Vital Signs Range for the last 24 hours  Temperature: Temp:  [36.4 °C (97.5 °F)] 36.4 °C (97.5 °F)   Temp Source: Temp src: Oral   BP: BP: (118)/(87) 118/87   Pulse: Heart Rate:  [75] 75   Respirations: Resp:  [16] 16   SPO2: SpO2:  [95 %] 95 %   O2 Amount (l/min):     O2 Devices Device (Oxygen  "Therapy): room air   Weight:       Flowsheet Rows      First Filed Value   Admission Height  162.6 cm (64\") Documented at 11/06/2018 1232   Admission Weight  72.6 kg (160 lb) Documented at 11/06/2018 1232          --------------------------------------------------------------------------------    Current Outpatient Medications   Medication Sig Dispense Refill   • atorvastatin (LIPITOR) 20 MG tablet Take 20 mg by mouth Daily.     • Cholecalciferol (VITAMIN D3) 2000 units tablet Take  by mouth.     • gabapentin (NEURONTIN) 600 MG tablet Take 600 mg by mouth 3 (Three) Times a Day.     • Multiple Vitamins-Minerals (MULTIVITAMIN WITH MINERALS) tablet tablet Take 1 tablet by mouth Daily.     • vitamin B-12 (CYANOCOBALAMIN) 1000 MCG tablet Take 1,000 mcg by mouth Daily.     • aspirin 81 MG EC tablet Take 81 mg by mouth Daily.     • Omega-3 Fatty Acids (FISH OIL) 1000 MG capsule capsule Take  by mouth Daily With Breakfast.       No current facility-administered medications for this encounter.        --------------------------------------------------------------------------------  Assessment/Plan      Anesthesia Evaluation                  Airway   Mallampati: II  Dental - normal exam     Pulmonary    Cardiovascular - normal exam        Neuro/Psych- neuro exam normal  (-) strength not normal in all 4 extremities, left straight leg raise test, right straight leg raise test  GI/Hepatic/Renal/Endo      Musculoskeletal     Abdominal    Substance History      OB/GYN          Other                   Diagnosis and Plan      Treatment Plan  ASA 2   Patient has had previous injection/procedure with % improvement.   Procedures: Lumbar Epidural Steroid Injection(LESI), With fluoroscopy,       Anesthetic plan and risks discussed with patient.          Diagnosis     * Lumbar degenerative disc disease [M51.36]     * Lumbar spinal stenosis [M48.061]                    "

## 2019-03-18 NOTE — ANESTHESIA PROCEDURE NOTES
PAIN Epidural block    Pre-sedation assessment completed: 3/18/2019 11:08 AM    Patient reassessed immediately prior to procedure    Patient location during procedure: pain clinic  Indication:procedure for pain  Performed By  Anesthesiologist: Alejo Caldwell MD  Preanesthetic Checklist  Completed: patient identified, site marked, surgical consent, pre-op evaluation, timeout performed, IV checked, risks and benefits discussed and monitors and equipment checked  Additional Notes  Post-Op Diagnosis Codes:     * Lumbar degenerative disc disease (M51.36)     * Lumbar spinal stenosis (M48.061)  Performed under fluoroscopic guidance  Prep:  Pt Position:prone  Sterile Tech:cap, gloves, mask and sterile barrier  Prep:chlorhexidine gluconate and isopropyl alcohol  Monitoring:blood pressure monitoring, continuous pulse oximetry and EKG  Procedure:Sedation: no     Approach:midline  Guidance: fluoroscopy  Location:lumbar (Intralaminar)  Level:4-5  Needle Type:Tuohy  Needle Gauge:20  Aspiration:negative  Medications:  Depomedrol:80  Preservative Free Saline:4mL  Comments:No contrast due to iodine allergy  Post Assessment:  Post-procedure: Band-aid.  Pt Tolerance:patient tolerated the procedure well with no apparent complications  Complications:no

## 2019-04-03 PROCEDURE — 0298T HOLTER MONITOR - 72 HOUR UP TO 21 DAY: CPT | Performed by: INTERNAL MEDICINE

## 2019-06-19 ENCOUNTER — OFFICE VISIT (OUTPATIENT)
Dept: CARDIOLOGY | Facility: CLINIC | Age: 79
End: 2019-06-19

## 2019-06-19 VITALS
SYSTOLIC BLOOD PRESSURE: 122 MMHG | WEIGHT: 154 LBS | HEIGHT: 64 IN | DIASTOLIC BLOOD PRESSURE: 84 MMHG | BODY MASS INDEX: 26.29 KG/M2

## 2019-06-19 DIAGNOSIS — I48.0 PAROXYSMAL ATRIAL FIBRILLATION (HCC): Primary | ICD-10-CM

## 2019-06-19 PROCEDURE — 99204 OFFICE O/P NEW MOD 45 MIN: CPT | Performed by: INTERNAL MEDICINE

## 2019-06-19 PROCEDURE — 93000 ELECTROCARDIOGRAM COMPLETE: CPT | Performed by: INTERNAL MEDICINE

## 2019-06-19 RX ORDER — ATENOLOL 25 MG/1
25 TABLET ORAL 2 TIMES DAILY
Refills: 0 | COMMUNITY
Start: 2019-05-30

## 2019-06-19 NOTE — PROGRESS NOTES
Date of Office Visit: 2019  Encounter Provider: Tree Marques MD  Place of Service: [facility] CARDIOLOGY  Patient Name: Nai Rutledge  : 1940    Subjective:     Encounter Date:2019      Patient ID: Nai Rutledge is a 78 y.o. female who has a cc of  Sent by jessica for AF on a zio.     She has a neuro deficit following a brain surgery for meningioma 20 years ago.       Symptoms --- She feels unusual beating in the left chest, lasting seconds    She did have an episode of longer lasting irreg beats and tachy and felt some near fainting. -- 15 minutes or so. Then Dr. JONES ordered a zio.     WHen she wore the zio which showed <1% AF and AF lasting 4 miinutes she can't recall having any symptoms.     Dr JONES has recently started on atenolol and no more long instances. Just skips.,     The patient had a good year.   No anginal chest pain,   No sig rico,   No soa,   No fainting,  No orthostasis.   No edema.   Exercise tolerance: no formal exercise.     There have been no hospital admission since the last visit.     There have been no bleeding events.       Past Medical History:   Diagnosis Date   • Back pain    • Cataract    • Hyperlipidemia    • Low back pain    • Meningioma (CMS/[facility])    • Seizures (CMS/[facility])    • Stroke (CMS/[facility])        Social History     Socioeconomic History   • Marital status:      Spouse name: Not on file   • Number of children: 2   • Years of education: BA   • Highest education level: Not on file   Occupational History   • Occupation: RETIRED   Tobacco Use   • Smoking status: Never Smoker   • Smokeless tobacco: Never Used   Substance and Sexual Activity   • Alcohol use: Yes     Alcohol/week: 6.0 oz     Types: 10 Glasses of wine per week   • Drug use: No   • Sexual activity: Defer   Social History Narrative    LIVES WITH SPOUSE       Review of Systems   Constitution: Negative for fever and night sweats.   HENT: Negative for ear pain and stridor.   "  Eyes: Negative for discharge and visual halos.   Cardiovascular: Negative for cyanosis.   Respiratory: Negative for hemoptysis and sputum production.    Hematologic/Lymphatic: Negative for adenopathy.   Skin: Negative for nail changes and unusual hair distribution.   Musculoskeletal: Positive for arthritis and back pain. Negative for gout and joint swelling.   Gastrointestinal: Negative for bowel incontinence and flatus.   Genitourinary: Negative for dysuria and flank pain.   Neurological: Negative for seizures and tremors.   Psychiatric/Behavioral: Negative for altered mental status. The patient is not nervous/anxious.             Objective:     Vitals:    06/19/19 1116   BP: 122/84   BP Location: Right arm   Patient Position: Sitting   Cuff Size: Large Adult   Weight: 69.9 kg (154 lb)   Height: 162.6 cm (64\")         Physical Exam   Constitutional: She is oriented to person, place, and time.   HENT:   Head: Normocephalic and atraumatic.   Eyes: Right eye exhibits no discharge. Left eye exhibits no discharge.   Neck: No JVD present. No thyromegaly present.   Cardiovascular: Normal rate and regular rhythm. Exam reveals no gallop and no friction rub.   No murmur heard.  Pulmonary/Chest: Effort normal and breath sounds normal. She has no rales.   Abdominal: Soft. Bowel sounds are normal. There is no tenderness.   Musculoskeletal: Normal range of motion. She exhibits no edema or deformity.   Neurological: She is alert and oriented to person, place, and time. She exhibits normal muscle tone.   Skin: Skin is warm and dry. No erythema.   Psychiatric: She has a normal mood and affect. Her behavior is normal. Thought content normal.         ECG 12 Lead  Date/Time: 6/19/2019 11:42 AM  Performed by: Tree Marques MD  Authorized by: Tree Marques MD   Comparison: compared with previous ECG   Similar to previous ECG  Rhythm: sinus rhythm  Rate: normal  Conduction: conduction normal  ST Segments: ST segments normal  T " Waves: T waves normal  QRS axis: normal    Clinical impression: normal ECG            Lab Review:       Assessment:          Diagnosis Plan   1. Paroxysmal atrial fibrillation (CMS/HCC)            Plan:     This is a tough call -- she had PAF w fast rates but very low burden (<1%) and quite short lived at longest being 4 minutes but it was AF. The atenolol has relieved the symptoms of AF and this is good;     But the question of oral AC -- re how much AF is enough. She has had a neuro event but this was not related to AF rather the surgery for brain tumor    She had been on two of the DOACs and she had blood streaked sputum and stopped the drug.     We disc the uncertainities here     She has no compelling reason to take oral AC -- due to super low burden AF     I think we can hold off and monitor     I would stop the ASA as recent data show no benefit.     If she thinks her AF burden is increasing we can reassess.

## 2019-06-28 ENCOUNTER — ANESTHESIA EVENT (OUTPATIENT)
Dept: PAIN MEDICINE | Facility: HOSPITAL | Age: 79
End: 2019-06-28

## 2019-06-28 ENCOUNTER — HOSPITAL ENCOUNTER (OUTPATIENT)
Dept: PAIN MEDICINE | Facility: HOSPITAL | Age: 79
Discharge: HOME | End: 2019-06-28
Admitting: ANESTHESIOLOGY

## 2019-06-28 ENCOUNTER — HOSPITAL ENCOUNTER (OUTPATIENT)
Dept: GENERAL RADIOLOGY | Facility: HOSPITAL | Age: 79
Discharge: HOME | End: 2019-06-28

## 2019-06-28 ENCOUNTER — ANESTHESIA (OUTPATIENT)
Dept: PAIN MEDICINE | Facility: HOSPITAL | Age: 79
End: 2019-06-28

## 2019-06-28 VITALS
HEART RATE: 49 BPM | OXYGEN SATURATION: 96 % | TEMPERATURE: 97.9 F | SYSTOLIC BLOOD PRESSURE: 111 MMHG | DIASTOLIC BLOOD PRESSURE: 68 MMHG | RESPIRATION RATE: 16 BRPM

## 2019-06-28 DIAGNOSIS — M48.062 SPINAL STENOSIS OF LUMBAR REGION WITH NEUROGENIC CLAUDICATION: ICD-10-CM

## 2019-06-28 DIAGNOSIS — M51.36 DDD (DEGENERATIVE DISC DISEASE), LUMBAR: Primary | ICD-10-CM

## 2019-06-28 DIAGNOSIS — R52 PAIN: ICD-10-CM

## 2019-06-28 PROCEDURE — 77003 FLUOROGUIDE FOR SPINE INJECT: CPT

## 2019-06-28 PROCEDURE — C1755 CATHETER, INTRASPINAL: HCPCS

## 2019-06-28 PROCEDURE — 25010000002 METHYLPREDNISOLONE PER 80 MG: Performed by: ANESTHESIOLOGY

## 2019-06-28 RX ORDER — MIDAZOLAM HYDROCHLORIDE 1 MG/ML
1 INJECTION INTRAMUSCULAR; INTRAVENOUS AS NEEDED
Status: DISCONTINUED | OUTPATIENT
Start: 2019-06-28 | End: 2019-06-29 | Stop reason: HOSPADM

## 2019-06-28 RX ORDER — FENTANYL CITRATE 50 UG/ML
50 INJECTION, SOLUTION INTRAMUSCULAR; INTRAVENOUS AS NEEDED
Status: DISCONTINUED | OUTPATIENT
Start: 2019-06-28 | End: 2019-06-29 | Stop reason: HOSPADM

## 2019-06-28 RX ORDER — LIDOCAINE HYDROCHLORIDE 10 MG/ML
1 INJECTION, SOLUTION INFILTRATION; PERINEURAL ONCE AS NEEDED
Status: DISCONTINUED | OUTPATIENT
Start: 2019-06-28 | End: 2019-06-29 | Stop reason: HOSPADM

## 2019-06-28 RX ORDER — METHYLPREDNISOLONE ACETATE 80 MG/ML
80 INJECTION, SUSPENSION INTRA-ARTICULAR; INTRALESIONAL; INTRAMUSCULAR; SOFT TISSUE ONCE
Status: COMPLETED | OUTPATIENT
Start: 2019-06-28 | End: 2019-06-28

## 2019-06-28 RX ORDER — SODIUM CHLORIDE 0.9 % (FLUSH) 0.9 %
1-10 SYRINGE (ML) INJECTION AS NEEDED
Status: DISCONTINUED | OUTPATIENT
Start: 2019-06-28 | End: 2019-06-29 | Stop reason: HOSPADM

## 2019-06-28 RX ADMIN — METHYLPREDNISOLONE ACETATE 80 MG: 80 INJECTION, SUSPENSION INTRA-ARTICULAR; INTRALESIONAL; INTRAMUSCULAR; SOFT TISSUE at 15:05

## 2019-06-28 NOTE — H&P
[facility]    History and Physical    Patient Name: Nai Rutledge  :  1940  MRN:  [id number]  Date of Admission: 2019    Subjective     Patient is a 79 y.o. female presents with chief complaint of chronic low back and leg: right pain.  She is starting a new series, the last epidural was in March and notes almost 100% improvement of the leg pain, and at least 50% improvement of the back pain. .  She has a history of left sided surgery at L5-S1 and spinal stenosis worse at L4-5.    The following portions of the patients history were reviewed and updated as appropriate: current medications, allergies, past medical history, past surgical history, past family history, past social history and problem list                Objective     Past Medical History:   Past Medical History:   Diagnosis Date   • Back pain    • Cataract    • Hyperlipidemia    • Low back pain    • Meningioma (CMS/HCC)    • Seizures (CMS/HCC)    • Stroke (CMS/HCC)      Past Surgical History:   Past Surgical History:   Procedure Laterality Date   • BRAIN SURGERY      MENINGIOMA, BLEEDING, SEIZURE, STOKE   • BREAST SURGERY     • CATARACT EXTRACTION       AND    • EPIDURAL BLOCK     • EYE SURGERY Right     MACULAR HOLE IN RETINA     Family History:   Family History   Problem Relation Age of Onset   • No Known Problems Mother    • Heart disease Father    • Lung cancer Sister      Social History:   Social History     Tobacco Use   • Smoking status: Never Smoker   • Smokeless tobacco: Never Used   Substance Use Topics   • Alcohol use: Yes     Alcohol/week: 6.0 oz     Types: 10 Glasses of wine per week   • Drug use: No       Vital Signs Range for the last 24 hours  Temperature: Temp:  [36.6 °C (97.9 °F)] 36.6 °C (97.9 °F)   Temp Source: Temp src: Oral   BP: BP: (140)/(81) 140/81   Pulse: Heart Rate:  [62] 62   Respirations: Resp:  [16] 16   SPO2: SpO2:  [97 %] 97 %   O2 Amount (l/min):     O2 Devices Device (Oxygen Therapy):  "room air   Weight:       Flowsheet Rows      First Filed Value   Admission Height  162.6 cm (64\") Documented at 11/06/2018 1232   Admission Weight  72.6 kg (160 lb) Documented at 11/06/2018 1232          --------------------------------------------------------------------------------    Current Outpatient Medications   Medication Sig Dispense Refill   • atenolol (TENORMIN) 25 MG tablet Take 25 mg by mouth 2 (Two) Times a Day.  0   • atorvastatin (LIPITOR) 20 MG tablet Take 20 mg by mouth Daily.     • Cholecalciferol (VITAMIN D3) 2000 units tablet Take  by mouth.     • gabapentin (NEURONTIN) 600 MG tablet Take 600 mg by mouth 3 (Three) Times a Day.     • Multiple Vitamins-Minerals (MULTIVITAMIN WITH MINERALS) tablet tablet Take 1 tablet by mouth Daily.     • Omega-3 Fatty Acids (FISH OIL) 1000 MG capsule capsule Take  by mouth Daily With Breakfast.     • vitamin B-12 (CYANOCOBALAMIN) 1000 MCG tablet Take 1,000 mcg by mouth Daily.     • aspirin 81 MG EC tablet Take 81 mg by mouth Daily.       Current Facility-Administered Medications   Medication Dose Route Frequency Provider Last Rate Last Dose   • fentaNYL citrate (PF) (SUBLIMAZE) injection 50 mcg  50 mcg Intravenous PRN Alejo Caldwell MD       • iopamidol (ISOVUE-M 200) injection 41%  12 mL Epidural Once in imaging Alejo Caldwell MD       • lidocaine (XYLOCAINE) 1 % injection 1 mL  1 mL Intradermal Once PRN Alejo Caldwell MD       • methylPREDNISolone acetate (DEPO-medrol) injection 80 mg  80 mg Intra-articular Once Alejo Caldwell MD       • midazolam (VERSED) injection 1 mg  1 mg Intravenous PRN Alejo Caldwell MD       • sodium chloride 0.9 % flush 1-10 mL  1-10 mL Intravenous PRN Alejo Caldwell MD           --------------------------------------------------------------------------------  Assessment/Plan      Anesthesia Evaluation                  Airway   Mallampati: II  Dental - normal exam     Pulmonary    Cardiovascular - normal exam        Neuro/Psych- neuro " exam normal  (+)   Strength normal in all four extremities,     (-) left straight leg raise test, right straight leg raise test    PE Comment: Hx left sided weakness  GI/Hepatic/Renal/Endo      Musculoskeletal     Abdominal    Substance History      OB/GYN          Other                 Diagnosis and Plan      Treatment Plan  ASA 2   Patient has had previous injection/procedure with 50-75% improvement.   Procedures: Lumbar Epidural Steroid Injection(LESI), With fluoroscopy,       Anesthetic plan and risks discussed with patient.          Diagnosis     * Lumbar degenerative disc disease [M51.36]     * Lumbar spinal stenosis [M48.061]

## 2019-06-28 NOTE — ANESTHESIA PROCEDURE NOTES
PAIN Epidural block    Pre-sedation assessment completed: 6/28/2019 2:54 PM    Patient reassessed immediately prior to procedure    Patient location during procedure: pain clinic  Start Time: 6/28/2019 2:59 PM  Stop Time: 6/28/2019 3:07 PM  Indication:procedure for pain  Performed By  Anesthesiologist: Alejo Caldwell MD  Preanesthetic Checklist  Completed: patient identified, site marked, surgical consent, pre-op evaluation, timeout performed, IV checked, risks and benefits discussed and monitors and equipment checked  Additional Notes  Post-Op Diagnosis Codes:     * Lumbar degenerative disc disease (M51.36)     * Lumbar spinal stenosis (M48.061)  Performed under fluoroscopic guidance  Prep:  Pt Position:prone  Sterile Tech:cap, gloves, mask and sterile barrier  Prep:chlorhexidine gluconate and isopropyl alcohol  Monitoring:blood pressure monitoring, continuous pulse oximetry and EKG  Procedure:Sedation: no     Approach:midline  Guidance: fluoroscopy  Location:lumbar (Intralaminar)  Level:4-5  Needle Type:Tuohy  Needle Gauge:20  Aspiration:negative  Medications:  Depomedrol:80  Preservative Free Saline:4mL  Comments:No contrast due to iodine allergies.  Post Assessment:  Post-procedure: Band-aid.  Pt Tolerance:patient tolerated the procedure well with no apparent complications  Complications:no

## 2019-07-17 ENCOUNTER — HOSPITAL ENCOUNTER (OUTPATIENT)
Dept: CT IMAGING | Facility: HOSPITAL | Age: 79
Discharge: HOME | End: 2019-07-17
Admitting: INTERNAL MEDICINE

## 2019-07-17 ENCOUNTER — TRANSCRIBE ORDERS (OUTPATIENT)
Dept: ADMINISTRATIVE | Facility: HOSPITAL | Age: 79
End: 2019-07-17

## 2019-07-17 DIAGNOSIS — T07.XXXA MULTIPLE CONTUSIONS: ICD-10-CM

## 2019-07-17 DIAGNOSIS — S09.90XA INJURY OF HEAD, INITIAL ENCOUNTER: ICD-10-CM

## 2019-07-17 DIAGNOSIS — S09.90XA INJURY OF HEAD, INITIAL ENCOUNTER: Primary | ICD-10-CM

## 2019-07-17 PROCEDURE — 70450 CT HEAD/BRAIN W/O DYE: CPT

## 2019-07-17 PROCEDURE — 70486 CT MAXILLOFACIAL W/O DYE: CPT

## 2019-07-17 NOTE — NURSING NOTE
Dr Quinones came to radiology triage and spoke with the patient and her daughter.  Okay for patient to go.

## 2019-07-18 ENCOUNTER — TRANSCRIBE ORDERS (OUTPATIENT)
Dept: ADMINISTRATIVE | Facility: HOSPITAL | Age: 79
End: 2019-07-18

## 2019-07-18 DIAGNOSIS — S06.5XAA SUBDURAL HEMATOMA (HCC): Primary | ICD-10-CM

## 2019-07-19 ENCOUNTER — HOSPITAL ENCOUNTER (OUTPATIENT)
Dept: CT IMAGING | Facility: HOSPITAL | Age: 79
Discharge: HOME | End: 2019-07-19
Admitting: INTERNAL MEDICINE

## 2019-07-19 DIAGNOSIS — S06.5XAA SUBDURAL HEMATOMA (HCC): ICD-10-CM

## 2019-07-19 PROCEDURE — 70450 CT HEAD/BRAIN W/O DYE: CPT

## 2019-07-22 ENCOUNTER — TRANSCRIBE ORDERS (OUTPATIENT)
Dept: ADMINISTRATIVE | Facility: HOSPITAL | Age: 79
End: 2019-07-22

## 2019-07-22 DIAGNOSIS — S06.5XAA SUBDURAL HEMATOMA, ACUTE (HCC): Primary | ICD-10-CM

## 2019-08-05 ENCOUNTER — HOSPITAL ENCOUNTER (OUTPATIENT)
Dept: CT IMAGING | Facility: HOSPITAL | Age: 79
Discharge: HOME | End: 2019-08-05
Admitting: INTERNAL MEDICINE

## 2019-08-05 DIAGNOSIS — S06.5XAA SUBDURAL HEMATOMA, ACUTE (HCC): ICD-10-CM

## 2019-08-05 PROCEDURE — 70450 CT HEAD/BRAIN W/O DYE: CPT

## 2019-09-04 ENCOUNTER — APPOINTMENT (OUTPATIENT)
Dept: WOMENS IMAGING | Facility: HOSPITAL | Age: 79
End: 2019-09-04

## 2019-09-04 PROCEDURE — 77063 BREAST TOMOSYNTHESIS BI: CPT | Performed by: RADIOLOGY

## 2019-09-04 PROCEDURE — 77067 SCR MAMMO BI INCL CAD: CPT | Performed by: RADIOLOGY

## 2019-09-04 PROCEDURE — MDREVIEWSP: Performed by: RADIOLOGY

## 2019-10-07 NOTE — PROGRESS NOTES
Subjective   Patient ID: Nai Rutledge is a 79 y.o. female is here today for follow-up after CLARENCE x 1 which helped.  She is not currently scheduled for additional CLARENCE at this time.    Patient was last seen 2.18.19 for back pain and was given order for warm n form brace. Patient states that she still has intermittent mild low back when walking long distances.  She denies leg pain or weakness.  Patient states that she did get the warm n form brace, however, she has not worn it because she has significant weakness right arm secondary to stroke and cannot put it on herself.    She is taking Gabapentin 600 mg TID    We have been following this patient for lumbar stenosis. Last time she had low back pain and left leg pain. She had 1 epidural block and it seemed to help the leg but the back still bothers her. She still takes gabapentin. She has never had medial branch blocks. She thinks the back pain is limiting. Will send her to see Dr. Sewell at the pain clinic. Perhaps medial branch block and radiofrequency ablation could be helpful to her. As long as she does not have sciatica, which is certainly the case right now, she does not need any kind of surgery.      Back Pain   The problem occurs intermittently. The problem has been gradually improving since onset. The pain is present in the lumbar spine. The pain does not radiate. The pain is at a severity of 4/10. The pain is moderate. The symptoms are aggravated by standing. Pertinent negatives include no numbness or weakness.       The following portions of the patient's history were reviewed and updated as appropriate: allergies, current medications, past family history, past medical history, past social history, past surgical history and problem list.    Review of Systems   Musculoskeletal: Positive for back pain. Negative for arthralgias, gait problem and myalgias.   Neurological: Negative for weakness and numbness.   All other systems reviewed and are  negative.      Objective   Physical Exam   Constitutional: She is oriented to person, place, and time. She appears well-developed and well-nourished.   HENT:   Head: Normocephalic and atraumatic.   Eyes: Conjunctivae and EOM are normal. Pupils are equal, round, and reactive to light.   Fundoscopic exam:       The right eye shows no papilledema. The right eye shows venous pulsations.        The left eye shows no papilledema. The left eye shows venous pulsations.   Neck: Carotid bruit is not present.   Neurological: She is oriented to person, place, and time. She has a normal Finger-Nose-Finger Test and a normal Heel to Shin Test. Gait normal.   Reflex Scores:       Tricep reflexes are 2+ on the right side and 2+ on the left side.       Bicep reflexes are 2+ on the right side and 2+ on the left side.       Brachioradialis reflexes are 2+ on the right side and 2+ on the left side.       Patellar reflexes are 2+ on the right side and 2+ on the left side.       Achilles reflexes are 2+ on the right side and 2+ on the left side.  Psychiatric: Her speech is normal.     Neurologic Exam     Mental Status   Oriented to person, place, and time.   Registration of memory: Good recent and remote memory.   Attention: normal. Concentration: normal.   Speech: speech is normal   Level of consciousness: alert  Knowledge: consistent with education.     Cranial Nerves     CN II   Visual fields full to confrontation.   Visual acuity: normal    CN III, IV, VI   Pupils are equal, round, and reactive to light.  Extraocular motions are normal.     CN V   Facial sensation intact.   Right corneal reflex: normal  Left corneal reflex: normal    CN VII   Facial expression full, symmetric.   Right facial weakness: none  Left facial weakness: none    CN VIII   Hearing: intact    CN IX, X   Palate: symmetric    CN XI   Right sternocleidomastoid strength: normal  Left sternocleidomastoid strength: normal    CN XII   Tongue: not atrophic  Tongue  deviation: none    Motor Exam   Muscle bulk: normal  Right arm tone: normal  Left arm tone: normal  Right leg tone: normal  Left leg tone: normal    Strength   Strength 5/5 except as noted.     Sensory Exam   Light touch normal.     Gait, Coordination, and Reflexes     Gait  Gait: normal    Coordination   Finger to nose coordination: normal  Heel to shin coordination: normal    Reflexes   Right brachioradialis: 2+  Left brachioradialis: 2+  Right biceps: 2+  Left biceps: 2+  Right triceps: 2+  Left triceps: 2+  Right patellar: 2+  Left patellar: 2+  Right achilles: 2+  Left achilles: 2+  Right : 2+  Left : 2+      Assessment/Plan   Independent Review of Radiographic Studies:    I reviewed the lumbar MRI done 6/29/2018 which shows spinal stenosis at L for L5 and L3-L4 and some postoperative changes on the left at L5-S1.  Agree with the report.      Medical Decision Making:    We will send her to see Dr. Sewell at the pain clinic for medial branch blocks and possible radiofrequency ablation.  We will keep an eye on her but will see her in 8 months.  If she develops sciatica in a severe way, she will let us know.      Nai was seen today for back pain.    Diagnoses and all orders for this visit:    DDD (degenerative disc disease), lumbar  -     Ambulatory Referral to Pain Management    Spinal stenosis of lumbar region with neurogenic claudication  -     Ambulatory Referral to Pain Management      Return in about 8 months (around 6/21/2020).

## 2019-10-21 ENCOUNTER — OFFICE VISIT (OUTPATIENT)
Dept: NEUROSURGERY | Facility: CLINIC | Age: 79
End: 2019-10-21

## 2019-10-21 DIAGNOSIS — M51.36 DDD (DEGENERATIVE DISC DISEASE), LUMBAR: Primary | ICD-10-CM

## 2019-10-21 DIAGNOSIS — M48.062 SPINAL STENOSIS OF LUMBAR REGION WITH NEUROGENIC CLAUDICATION: ICD-10-CM

## 2019-10-21 PROCEDURE — 99214 OFFICE O/P EST MOD 30 MIN: CPT | Performed by: NEUROLOGICAL SURGERY

## 2019-11-12 ENCOUNTER — ANESTHESIA EVENT (OUTPATIENT)
Dept: PAIN MEDICINE | Facility: HOSPITAL | Age: 79
End: 2019-11-12

## 2019-11-12 ENCOUNTER — HOSPITAL ENCOUNTER (OUTPATIENT)
Dept: PAIN MEDICINE | Facility: HOSPITAL | Age: 79
Discharge: HOME | End: 2019-11-12
Admitting: ANESTHESIOLOGY

## 2019-11-12 ENCOUNTER — ANESTHESIA (OUTPATIENT)
Dept: PAIN MEDICINE | Facility: HOSPITAL | Age: 79
End: 2019-11-12

## 2019-11-12 ENCOUNTER — HOSPITAL ENCOUNTER (OUTPATIENT)
Dept: GENERAL RADIOLOGY | Facility: HOSPITAL | Age: 79
Discharge: HOME | End: 2019-11-12

## 2019-11-12 VITALS
HEART RATE: 48 BPM | DIASTOLIC BLOOD PRESSURE: 69 MMHG | OXYGEN SATURATION: 93 % | RESPIRATION RATE: 16 BRPM | WEIGHT: 155 LBS | TEMPERATURE: 97.8 F | SYSTOLIC BLOOD PRESSURE: 119 MMHG | BODY MASS INDEX: 26.61 KG/M2

## 2019-11-12 DIAGNOSIS — R52 PAIN: ICD-10-CM

## 2019-11-12 DIAGNOSIS — M47.816 SPONDYLOSIS OF LUMBAR REGION WITHOUT MYELOPATHY OR RADICULOPATHY: Primary | ICD-10-CM

## 2019-11-12 PROCEDURE — 25010000002 ROPIVACAINE PER 1 MG: Performed by: ANESTHESIOLOGY

## 2019-11-12 PROCEDURE — 77003 FLUOROGUIDE FOR SPINE INJECT: CPT

## 2019-11-12 RX ORDER — SODIUM CHLORIDE 0.9 % (FLUSH) 0.9 %
3 SYRINGE (ML) INJECTION EVERY 12 HOURS SCHEDULED
Status: DISCONTINUED | OUTPATIENT
Start: 2019-11-12 | End: 2019-11-13 | Stop reason: HOSPADM

## 2019-11-12 RX ORDER — MIDAZOLAM HYDROCHLORIDE 1 MG/ML
1 INJECTION INTRAMUSCULAR; INTRAVENOUS AS NEEDED
Status: DISCONTINUED | OUTPATIENT
Start: 2019-11-12 | End: 2019-11-13 | Stop reason: HOSPADM

## 2019-11-12 RX ORDER — SODIUM CHLORIDE 0.9 % (FLUSH) 0.9 %
3-10 SYRINGE (ML) INJECTION AS NEEDED
Status: DISCONTINUED | OUTPATIENT
Start: 2019-11-12 | End: 2019-11-13 | Stop reason: HOSPADM

## 2019-11-12 RX ORDER — ROPIVACAINE HYDROCHLORIDE 5 MG/ML
20 INJECTION, SOLUTION EPIDURAL; INFILTRATION; PERINEURAL ONCE
Status: COMPLETED | OUTPATIENT
Start: 2019-11-12 | End: 2019-11-12

## 2019-11-12 RX ORDER — GLUCOSAMINE/D3/BOSWELLIA SERRA 1500MG-400
1 TABLET ORAL DAILY
COMMUNITY

## 2019-11-12 RX ORDER — LIDOCAINE HYDROCHLORIDE 10 MG/ML
1 INJECTION, SOLUTION INFILTRATION; PERINEURAL ONCE
Status: DISCONTINUED | OUTPATIENT
Start: 2019-11-12 | End: 2019-11-13 | Stop reason: HOSPADM

## 2019-11-12 RX ORDER — FENTANYL CITRATE 50 UG/ML
50 INJECTION, SOLUTION INTRAMUSCULAR; INTRAVENOUS AS NEEDED
Status: DISCONTINUED | OUTPATIENT
Start: 2019-11-12 | End: 2019-11-13 | Stop reason: HOSPADM

## 2019-11-12 RX ADMIN — ROPIVACAINE HYDROCHLORIDE 30 ML: 5 INJECTION EPIDURAL; INFILTRATION; PERINEURAL at 12:32

## 2019-11-12 NOTE — H&P
CHIEF COMPLAINT:  Low back pain        HISTORY OF PRESENT ILLNESS:  This patient is complaining of low back pain which radiates in a bandlike pattern across her back.  She does also have a bit of pain into her left buttock at times.  It ranges between a 0 and 8 out of 10 on the pain scale.  The pain is described as aching, cramping, pressure in nature. The pain is exacerbated by standing or walking in an upright position and lifting, and relieved by sitting or bending forward.  The pain is significantly decreasing the patient's Activities of Daily Living.  Diagnostic imaging including an MRI from June 2018 shows multilevel spondylosis and stenosis.  Full dictation by the radiologist is available in the chart.    This patient was referred to our clinic by Dr. Vijay Berumen for lumbar facet medial branch blocks and possible radiofrequency ablation if the diagnostic procedures are positive.    She has had one prior lumbar epidural steroid injection which greatly decrease the pain in her leg.  She is now complaining primarily of a bandlike pattern of pain across her back which is consistent with lumbar facet arthropathy and spondylosis.     PAST MEDICAL HISTORY:  Current Outpatient Medications on File Prior to Encounter   Medication Sig Dispense Refill   • atenolol (TENORMIN) 25 MG tablet Take 25 mg by mouth 2 (Two) Times a Day.  0   • atorvastatin (LIPITOR) 20 MG tablet Take 20 mg by mouth Daily.     • Biotin 53772 MCG tablet Take 1 tablet by mouth Daily.     • Cholecalciferol (VITAMIN D3) 2000 units tablet Take  by mouth.     • gabapentin (NEURONTIN) 600 MG tablet Take 600 mg by mouth 3 (Three) Times a Day.     • Multiple Vitamins-Minerals (MULTIVITAMIN WITH MINERALS) tablet tablet Take 1 tablet by mouth Daily.     • vitamin B-12 (CYANOCOBALAMIN) 1000 MCG tablet Take 1,000 mcg by mouth Daily.     • Omega-3 Fatty Acids (FISH OIL) 1000 MG capsule capsule Take  by mouth Daily With Breakfast.     • [DISCONTINUED]  aspirin 81 MG EC tablet Take 81 mg by mouth Daily.       No current facility-administered medications on file prior to encounter.        Past Medical History:   Diagnosis Date   • Atrial fibrillation (CMS/HCC)    • Back pain    • Cataract    • Hyperlipidemia    • Low back pain    • Meningioma (CMS/HCC) 2000   • Seizures (CMS/HCC)    • Stroke (CMS/HCC)        SOCIAL HISTORY:  Counseled to avoid tobacco     REVIEW OF SYSTEMS:  No pertinent hematologic, infectious, or constitutional symptoms.  Other review of systems non-contributory     PHYSICAL EXAM:  /90 (BP Location: Left arm, Patient Position: Lying)   Pulse 51   Temp 36.6 °C (97.8 °F) (Oral)   Resp 16   Wt 70.3 kg (155 lb)   SpO2 96%   BMI 26.61 kg/m²   Constitutional: Well-developed well-nourished no acute distress  General: Alert and oriented  Ophtho: EOMI  Airway: Mallampati 2  Cardiac:  Regular rate  Lungs:  Clear to auscultation bilaterally   GI: soft, nontender  Cervical Spine: Noncontributory  Sacroiliac Joints: Noncontributory  Musc: Decreased range of motion and pain primarily with standing or walking in upright position for short period of time  Neuro: Tenderness palpation over the lower 3 lumbar facets bilaterally     DIAGNOSIS:  Post-Op Diagnosis Codes:  Post-Op Diagnosis Codes:     * Spondylosis without myelopathy or radiculopathy, lumbar region [M47.816]     * Lumbar stenosis with neurogenic claudication [M48.062]     * DDD (degenerative disc disease), lumbar [M51.36]    PLAN:  -Bilateral Lumbar facet medial branch injections to cover the level(s) of L3-L4, L4-L5, and L5-S1 spaced approximately 2-4 weeks apart. These injections will be for diagnostic purposes. If pain control is acceptable but temporary, it was discussed with the patient that they may be a candidate for radiofrequency lesioning in the future.     - Risks were discussed with the patient, including but not limited to: failure of relief, worsening pain, tissue, nerve, or  blood vessel damage, bleeding, infection, and abscess formation. Benefits and alternatives were also discussed. No promises were made. The patient voiced understanding.  -  Physical therapy exercises at home should be considered, as tolerated, as prescribed by physical therapy or from the pain clinic handout (given to the patient).  Continuation of these exercises every day, or multiple times per week, even when the patient has good pain relief, was stressed to the patient as a preventative measure to decrease the frequency and severity of future pain episodes.  -  It is recommended that the patient use the least amount of opioid medication possible.     -  Heat and ice to the affected area as tolerated for pain control.  It was discussed that heating pads can cause burns.  -  Daily low impact exercise such as walking or water exercise was recommended to maintain overall health and aid in weight control.   -  Patient was counseled to abstain from tobacco products.  -  Follow up as needed for subsequent injections.  -This patient had one prior lumbar epidural steroid injection which did help the pain in her leg.  It is possible that she would benefit from future lumbar epidural steroid injections for her spinal stenosis.  We will keep this as an option in the future if necessary.      Som Sewell M.D.  [facility] [employer], [facility], [facility]  Board Certified Pain Medicine Specialist by the American Board of Anesthesiology  Board Certified Anesthesiologist by the American Board of Anesthesiology     Much of this encounter note is an electronic transcription/translation of spoken language to printed text. The electronic translation of spoken language may permit erroneous, or at times, nonsensical words or phrases to be inadvertently transcribed. Although I have reviewed the note for such errors, some may still exist.

## 2019-11-12 NOTE — ANESTHESIA PROCEDURE NOTES
Bilateral lumbar facet medial branch injections to cover the levels of L3-L4, L4-L5, and L5-S1.    Pre-sedation assessment completed: 11/12/2019 12:25 PM    Patient reassessed immediately prior to procedure    Patient location during procedure: Pain Clinic  Start time: 11/12/2019 12:27 PM  Stop Time: 11/12/2019 12:40 PM    Reason for block: procedure for pain  Performed by  Anesthesiologist: Som Sewell MD  Preanesthetic Checklist  Completed: patient identified, site marked, surgical consent, pre-op evaluation, timeout performed, IV checked, risks and benefits discussed and monitors and equipment checked  Prep:  Patient position: prone  Sterile barriers:cap, gloves, mask and sterile barrier  Prep: ChloraPrep  Patient monitoring: blood pressure monitoring, continuous pulse oximetry and EKG  Procedure:  Sedation:no  Approach:bilateral  Guidance:fluoroscopy  Location:lumbar  Interspace: To cover the levels of L3-L4, L4-L5, and L5-S1.  Needle Type:Quincke  Needle Gauge:25 G  Aspiration:negative  Medications:  Comment:With 0.5% Ropivacaine, 0.5 mls per injection site.     Post Assessment  Injection Assessment: negative  Patient Tolerance:comfortable throughout block  Complications:no  Additional Notes  Post-Op Diagnosis Codes:     * Spondylosis without myelopathy or radiculopathy, lumbar region (M47.816)     * Lumbar stenosis with neurogenic claudication (M48.062)     * DDD (degenerative disc disease), lumbar (M51.36)    The patient was observed in recovery with no evidence of neurological deficits or other problems. All questions were answered. The patient was discharged with appropriate instructions.

## 2019-12-30 ENCOUNTER — HOSPITAL ENCOUNTER (OUTPATIENT)
Dept: GENERAL RADIOLOGY | Facility: HOSPITAL | Age: 79
Discharge: HOME | End: 2019-12-30

## 2019-12-30 ENCOUNTER — ANESTHESIA (OUTPATIENT)
Dept: PAIN MEDICINE | Facility: HOSPITAL | Age: 79
End: 2019-12-30

## 2019-12-30 ENCOUNTER — ANESTHESIA EVENT (OUTPATIENT)
Dept: PAIN MEDICINE | Facility: HOSPITAL | Age: 79
End: 2019-12-30

## 2019-12-30 ENCOUNTER — HOSPITAL ENCOUNTER (OUTPATIENT)
Dept: PAIN MEDICINE | Facility: HOSPITAL | Age: 79
Discharge: HOME | End: 2019-12-30
Admitting: ANESTHESIOLOGY

## 2019-12-30 VITALS
DIASTOLIC BLOOD PRESSURE: 79 MMHG | TEMPERATURE: 98 F | RESPIRATION RATE: 16 BRPM | OXYGEN SATURATION: 94 % | BODY MASS INDEX: 26.46 KG/M2 | HEART RATE: 47 BPM | WEIGHT: 155 LBS | SYSTOLIC BLOOD PRESSURE: 128 MMHG | HEIGHT: 64 IN

## 2019-12-30 DIAGNOSIS — R52 PAIN: ICD-10-CM

## 2019-12-30 DIAGNOSIS — M47.816 SPONDYLOSIS OF LUMBAR REGION WITHOUT MYELOPATHY OR RADICULOPATHY: ICD-10-CM

## 2019-12-30 PROCEDURE — 77003 FLUOROGUIDE FOR SPINE INJECT: CPT

## 2019-12-30 PROCEDURE — 25010000002 ROPIVACAINE PER 1 MG: Performed by: ANESTHESIOLOGY

## 2019-12-30 RX ORDER — MIDAZOLAM HYDROCHLORIDE 1 MG/ML
1 INJECTION INTRAMUSCULAR; INTRAVENOUS AS NEEDED
Status: DISCONTINUED | OUTPATIENT
Start: 2019-12-30 | End: 2019-12-31 | Stop reason: HOSPADM

## 2019-12-30 RX ORDER — SODIUM CHLORIDE 0.9 % (FLUSH) 0.9 %
3-10 SYRINGE (ML) INJECTION AS NEEDED
Status: DISCONTINUED | OUTPATIENT
Start: 2019-12-30 | End: 2019-12-31 | Stop reason: HOSPADM

## 2019-12-30 RX ORDER — FENTANYL CITRATE 50 UG/ML
50 INJECTION, SOLUTION INTRAMUSCULAR; INTRAVENOUS AS NEEDED
Status: DISCONTINUED | OUTPATIENT
Start: 2019-12-30 | End: 2019-12-31 | Stop reason: HOSPADM

## 2019-12-30 RX ORDER — ROPIVACAINE HYDROCHLORIDE 5 MG/ML
20 INJECTION, SOLUTION EPIDURAL; INFILTRATION; PERINEURAL ONCE
Status: COMPLETED | OUTPATIENT
Start: 2019-12-30 | End: 2019-12-30

## 2019-12-30 RX ORDER — SODIUM CHLORIDE 0.9 % (FLUSH) 0.9 %
3 SYRINGE (ML) INJECTION EVERY 12 HOURS SCHEDULED
Status: DISCONTINUED | OUTPATIENT
Start: 2019-12-30 | End: 2019-12-31 | Stop reason: HOSPADM

## 2019-12-30 RX ORDER — LIDOCAINE HYDROCHLORIDE 10 MG/ML
1 INJECTION, SOLUTION INFILTRATION; PERINEURAL ONCE
Status: DISCONTINUED | OUTPATIENT
Start: 2019-12-30 | End: 2019-12-31 | Stop reason: HOSPADM

## 2019-12-30 RX ADMIN — ROPIVACAINE HYDROCHLORIDE 30 ML: 5 INJECTION EPIDURAL; INFILTRATION; PERINEURAL at 11:07

## 2019-12-30 NOTE — ANESTHESIA PROCEDURE NOTES
Bilateral lumbar facet medial branch blocks to cover the levels of L3-L4, L4-L5, and L5-S1.    Pre-sedation assessment completed: 12/30/2019 10:50 AM    Patient reassessed immediately prior to procedure    Patient location during procedure: Pain Clinic  Start time: 12/30/2019 10:52 AM  Stop Time: 12/30/2019 11:06 AM    Reason for block: procedure for pain  Performed by  Anesthesiologist: Som Sewell MD  Preanesthetic Checklist  Completed: patient identified, site marked, surgical consent, pre-op evaluation, timeout performed, IV checked, risks and benefits discussed and monitors and equipment checked  Prep:  Patient position: prone  Sterile barriers:cap, gloves, mask and sterile barrier  Prep: ChloraPrep  Patient monitoring: blood pressure monitoring, continuous pulse oximetry and EKG  Procedure:  Sedation:no  Approach:bilateral  Guidance:fluoroscopy  Location:lumbar  Interspace: To cover the levels of L3-L4, L4-L5, and L5-S1.  Needle Type:Quincke  Needle Gauge:25 G  Aspiration:negative  Medications:  Comment:With 0.5% Ropivacaine, 0.5 mls per injection site.     Post Assessment  Injection Assessment: negative  Patient Tolerance:comfortable throughout block  Complications:no  Additional Notes  Post-Op Diagnosis Codes:     * Spondylosis without myelopathy or radiculopathy, lumbar region (M47.816)     * Lumbar stenosis with neurogenic claudication (M48.062)     * DDD (degenerative disc disease), lumbar (M51.36)    The patient was observed in recovery with no evidence of neurological deficits or other problems. All questions were answered. The patient was discharged with appropriate instructions.

## 2019-12-30 NOTE — H&P
"CHIEF COMPLAINT:  Low back pain        HISTORY OF PRESENT ILLNESS:  This patient is complaining of low back pain which radiates in a bandlike pattern across her back.  At a previous visit she had lumbar facet medial branch blocks bilaterally from L3-L4 down through L5-S1.  She states that this afforded her about 75% pain relief that was temporary.  She states that these injections significantly increased activities of daily living.      PAST MEDICAL HISTORY:  Current Outpatient Medications on File Prior to Encounter   Medication Sig Dispense Refill   • atenolol (TENORMIN) 25 MG tablet Take 25 mg by mouth 2 (Two) Times a Day.  0   • atorvastatin (LIPITOR) 20 MG tablet Take 20 mg by mouth Daily.     • Biotin 86073 MCG tablet Take 1 tablet by mouth Daily.     • Cholecalciferol (VITAMIN D3) 2000 units tablet Take  by mouth.     • gabapentin (NEURONTIN) 600 MG tablet Take 600 mg by mouth 3 (Three) Times a Day.     • Multiple Vitamins-Minerals (MULTIVITAMIN WITH MINERALS) tablet tablet Take 1 tablet by mouth Daily.     • Omega-3 Fatty Acids (FISH OIL) 1000 MG capsule capsule Take  by mouth Daily With Breakfast.     • vitamin B-12 (CYANOCOBALAMIN) 1000 MCG tablet Take 1,000 mcg by mouth Daily.       No current facility-administered medications on file prior to encounter.        Past Medical History:   Diagnosis Date   • Atrial fibrillation (CMS/HCC)    • Back pain    • Cataract    • Hyperlipidemia    • Low back pain    • Meningioma (CMS/HCC) 2000   • Seizures (CMS/HCC)    • Spondylosis of lumbar region without myelopathy or radiculopathy 11/12/2019   • Stroke (CMS/HCC)        SOCIAL HISTORY:  Counseled to avoid tobacco     REVIEW OF SYSTEMS:  No pertinent hematologic, infectious, or constitutional symptoms.  Other review of systems non-contributory     PHYSICAL EXAM:  /74 (BP Location: Left arm, Patient Position: Lying)   Pulse 50   Temp 36.7 °C (98 °F) (Oral)   Resp 16   Ht 162.6 cm (64\")   Wt 70.3 kg (155 lb)  "  SpO2 95%   BMI 26.61 kg/m²   Constitutional: Well-developed well-nourished no acute distress  General: Alert and oriented  Ophtho: EOMI  Airway: Mallampati 2  Cardiac:  Regular rate  Lungs:  Clear to auscultation bilaterally   GI: soft, nontender  Cervical Spine: Noncontributory  Sacroiliac Joints: Noncontributory  Musc: Decreased range of motion and pain primarily with standing or walking in an upright position for short period of time  Neuro: Tenderness to palpation over the lower 3 lumbar facets bilaterally     DIAGNOSIS:  Post-Op Diagnosis Codes:  Post-Op Diagnosis Codes:     * Spondylosis without myelopathy or radiculopathy, lumbar region [M47.816]     * Lumbar stenosis with neurogenic claudication [M48.062]     * DDD (degenerative disc disease), lumbar [M51.36]     PLAN:  -Bilateral Lumbar facet medial branch injections to cover the level(s) of L3-L4, L4-L5, and L5-S1. These injections will be for diagnostic purposes.  This will be her second diagnostic block. If pain control is acceptable but temporary, it was discussed with the patient that she will be a candidate for radiofrequency lesioning in the future.  We will schedule left-sided radiofrequency ablation at L3-L4, L4-L5, and L5-S1 to be performed at her next visit in the event that today's injections are beneficial but temporary.  We will also consider future right-sided injections to be performed if necessary at that time.  - Risks were discussed with the patient, including but not limited to: failure of relief, worsening pain, tissue, nerve, or blood vessel damage, bleeding, infection, and abscess formation. Benefits and alternatives were also discussed. No promises were made. The patient voiced understanding.  -  Physical therapy exercises at home should be considered, as tolerated, as prescribed by physical therapy or from the pain clinic handout (given to the patient).  Continuation of these exercises every day, or multiple times per week, even  when the patient has good pain relief, was stressed to the patient as a preventative measure to decrease the frequency and severity of future pain episodes.  -  It is recommended that the patient use the least amount of opioid medication possible.     -  Heat and ice to the affected area as tolerated for pain control.  It was discussed that heating pads can cause burns.  -  Daily low impact exercise such as walking or water exercise was recommended to maintain overall health and aid in weight control.   -  Patient was counseled to abstain from tobacco products.  -  Follow up as needed for subsequent injections.      Som Sewell M.D.  [facility], [facility], [facility]  Board Certified in Pain Medicine by the American Board of Anesthesiology  Board Certified in Anesthesiology by the American Board of Anesthesiology     Much of this encounter note is an electronic transcription/translation of spoken language to printed text. The electronic translation of spoken language may permit erroneous, or at times, nonsensical words or phrases to be inadvertently transcribed. Although I have reviewed the note for such errors, some may still exist.

## 2020-01-03 ENCOUNTER — OFFICE VISIT (OUTPATIENT)
Dept: CARDIOLOGY | Facility: CLINIC | Age: 80
End: 2020-01-03

## 2020-01-03 VITALS
HEART RATE: 57 BPM | HEIGHT: 64 IN | WEIGHT: 152 LBS | BODY MASS INDEX: 25.95 KG/M2 | SYSTOLIC BLOOD PRESSURE: 118 MMHG | DIASTOLIC BLOOD PRESSURE: 74 MMHG

## 2020-01-03 DIAGNOSIS — I48.0 PAROXYSMAL ATRIAL FIBRILLATION (HCC): Primary | ICD-10-CM

## 2020-01-03 PROCEDURE — 99213 OFFICE O/P EST LOW 20 MIN: CPT | Performed by: NURSE PRACTITIONER

## 2020-01-03 PROCEDURE — 93000 ELECTROCARDIOGRAM COMPLETE: CPT | Performed by: NURSE PRACTITIONER

## 2020-01-03 NOTE — PROGRESS NOTES
Date of Office Visit: 2020  Encounter Provider: EARL Chun  Place of Service: [facility] CARDIOLOGY  Patient Name: Nai Rutledge  :1940    Chief Complaint   Patient presents with   • Atrial Fibrillation   :     HPI: Nai Rutledge is a 79 y.o. female who is a patient that was referred to Dr. Marques by Dr. Quinones for a monitor that revealed some A. fib.  Dr. Marques saw her in , at that time she had already been placed on atenolol and had definitely made a difference in her palpitations and periods of feelings of irregular heartbeat and her ZIO showed less than 1% AF with the longest episode lasting 4 minutes in duration.    History of a meningioma and had brain surgery and had a stroke during that time that affected her right side and speech.  That was 20 years ago and she has significantly improved as far as her deficits and really only has limitation of her right upper extremity at this time.    She presents today for routine follow-up.  She reports that she is doing well from a cardiac standpoint.  She says that she still has some occasional rare palpitations and in the last 6 months she thinks that she has maybe had one episode of A. Fib about 4 months ago that lasted maybe a couple of hours but she is not sure that it really lasted that long or if it just wore her out when it happened.  Occurred just prior to going to bed she is unaware of any particular triggers although she does drink about 2 glasses of wine most nights and she may have had a little more that night than she usually does.  That is the only episode she has had in the last 6 months.    She has been on NOAC's the past and had some episodes of blood-tinged sputum so when Dr. Marques saw her in  they discussed it and decided against putting her on anticoagulation at that time given her really brief and low AF burden.    She denies chest pain, dyspnea, PND, orthopnea, dizziness or  edema.    She does not exercise regularly as she has spinal stenosis and has had some trouble with that.  She did have a recent epidural injection to see if that will help.  She is done lots of physical therapy in her life but says that she thinks that she may try going back to some water exercises.       Past Medical History:   Diagnosis Date   • Atrial fibrillation (CMS/HCC)    • Back pain    • Cataract    • Hyperlipidemia    • Low back pain    • Meningioma (CMS/[facility]) 2000   • PAF (paroxysmal atrial fibrillation) (CMS/[facility])    • Seizures (CMS/[facility])    • Spondylosis of lumbar region without myelopathy or radiculopathy 11/12/2019   • Stroke (CMS/[facility])        Past Surgical History:   Procedure Laterality Date   • BRAIN SURGERY  2000    MENINGIOMA, BLEEDING, SEIZURE, STOKE   • BREAST SURGERY     • CATARACT EXTRACTION      2004 AND 2013   • EPIDURAL BLOCK     • EYE SURGERY Right 2000    MACULAR HOLE IN RETINA       Social History     Socioeconomic History   • Marital status:      Spouse name: Not on file   • Number of children: 2   • Years of education: BA   • Highest education level: Not on file   Occupational History   • Occupation: RETIRED   Tobacco Use   • Smoking status: Never Smoker   • Smokeless tobacco: Never Used   Substance and Sexual Activity   • Alcohol use: Yes     Alcohol/week: 10.0 standard drinks     Types: 10 Glasses of wine per week   • Drug use: No   • Sexual activity: Defer   Social History Narrative    LIVES WITH SPOUSE       Family History   Problem Relation Age of Onset   • No Known Problems Mother    • Heart disease Father    • Lung cancer Sister        Review of Systems   Constitution: Negative for chills, fever and malaise/fatigue.   Cardiovascular: Positive for palpitations. Negative for chest pain, dyspnea on exertion, leg swelling, near-syncope, orthopnea, paroxysmal nocturnal dyspnea and syncope.   Respiratory: Negative for cough and shortness of breath.    Musculoskeletal: Positive for  "arthritis. Negative for joint pain, joint swelling and myalgias.        Spinal stenosis   Gastrointestinal: Negative for abdominal pain, diarrhea, melena, nausea and vomiting.   Genitourinary: Negative for frequency and hematuria.   Neurological: Negative for light-headedness, numbness, paresthesias and seizures.   Allergic/Immunologic: Negative.    All other systems reviewed and are negative.      Allergies   Allergen Reactions   • Nickel Other (See Comments)     Showed up as allergic on allergy testing   • Iodine Rash   • Sulfa Antibiotics Rash         Current Outpatient Medications:   •  atenolol (TENORMIN) 25 MG tablet, Take 25 mg by mouth 2 (Two) Times a Day., Disp: , Rfl: 0  •  atorvastatin (LIPITOR) 20 MG tablet, Take 20 mg by mouth Daily., Disp: , Rfl:   •  Biotin 14442 MCG tablet, Take 1 tablet by mouth Daily., Disp: , Rfl:   •  Cholecalciferol (VITAMIN D3) 2000 units tablet, Take  by mouth., Disp: , Rfl:   •  gabapentin (NEURONTIN) 600 MG tablet, Take 600 mg by mouth 3 (Three) Times a Day., Disp: , Rfl:   •  Multiple Vitamins-Minerals (MULTIVITAMIN WITH MINERALS) tablet tablet, Take 1 tablet by mouth Daily., Disp: , Rfl:   •  Omega-3 Fatty Acids (FISH OIL) 1000 MG capsule capsule, Take  by mouth Daily With Breakfast., Disp: , Rfl:   •  vitamin B-12 (CYANOCOBALAMIN) 1000 MCG tablet, Take 1,000 mcg by mouth Daily., Disp: , Rfl:       Objective:     Vitals:    01/03/20 1105   BP: 118/74   Pulse: 57   Weight: 68.9 kg (152 lb)   Height: 162.6 cm (64.02\")     Body mass index is 26.08 kg/m².    PHYSICAL EXAM:    Vitals Reviewed.   General Appearance: No acute distress, well developed and well nourished.   Eyes: Conjunctiva and lids: No erythema, swelling, or discharge. Sclera non-icteric.   HENT: Atraumatic, normocephalic. External eyes, ears, and nose normal.   Respiratory: No signs of respiratory distress. Respiration rhythm and depth normal.   Clear to auscultation. No rales, crackles, rhonchi, or wheezing " auscultated.   Cardiovascular:  Jugular Venous Pressure: Normal  Heart Rate and Rhythm: Normal, Heart Sounds: Normal S1 and S2. No S3 or S4 noted.  Murmurs: No murmurs noted. No rubs, thrills, or gallops.   Arterial Pulses:  Posterior tibialis and dorsalis pedis pulses normal.   Lower Extremities: No edema noted.  Gastrointestinal:  Abdomen soft, non-distended, non-tender.   Musculoskeletal: Moves all extremities, limited range of motion to right upper extremity.  Skin and Nails: General appearance normal. No pallor, cyanosis, diaphoresis. Skin temperature normal. No clubbing of fingernails.   Psychiatric: Patient alert and oriented to person, place, and time. Speech and behavior appropriate. Normal mood and affect.       ECG 12 Lead  Date/Time: 1/3/2020 11:43 AM  Performed by: Ro Dudley APRN  Authorized by: Ro Dudley APRN   Comparison: compared with previous ECG   Similar to previous ECG  Rhythm: sinus rhythm  BPM: 57                Assessment:       Diagnosis Plan   1. Paroxysmal atrial fibrillation (CMS/[facility])            Plan:       Paroxysmal atrial fib, one episode in the last 6 months, maybe a couple of hours in duration although she is not exactly sure.  She says that the atenolol has helped tremendously with the palpitations and skips.  We discussed risk factor modification, we discussed possible EtOH as a trigger, she is going to monitor these things.  I will have her see Dr. Guallpa again in 6 months but have told her that if she starts experiencing increased episodes she needs to call us and we will see her sooner.    As always, it has been a pleasure to participate in your patient's care.      Sincerely,         EARL Schafer

## 2020-01-30 ENCOUNTER — HOSPITAL ENCOUNTER (OUTPATIENT)
Dept: GENERAL RADIOLOGY | Facility: HOSPITAL | Age: 80
Discharge: HOME | End: 2020-01-30

## 2020-01-30 ENCOUNTER — HOSPITAL ENCOUNTER (OUTPATIENT)
Dept: PAIN MEDICINE | Facility: HOSPITAL | Age: 80
Discharge: HOME | End: 2020-01-30
Admitting: ANESTHESIOLOGY

## 2020-01-30 ENCOUNTER — ANESTHESIA EVENT (OUTPATIENT)
Dept: PAIN MEDICINE | Facility: HOSPITAL | Age: 80
End: 2020-01-30

## 2020-01-30 ENCOUNTER — ANESTHESIA (OUTPATIENT)
Dept: PAIN MEDICINE | Facility: HOSPITAL | Age: 80
End: 2020-01-30

## 2020-01-30 VITALS
OXYGEN SATURATION: 93 % | HEIGHT: 64 IN | RESPIRATION RATE: 18 BRPM | DIASTOLIC BLOOD PRESSURE: 72 MMHG | BODY MASS INDEX: 26.46 KG/M2 | WEIGHT: 155 LBS | TEMPERATURE: 98.5 F | HEART RATE: 51 BPM | SYSTOLIC BLOOD PRESSURE: 113 MMHG

## 2020-01-30 DIAGNOSIS — M47.816 SPONDYLOSIS OF LUMBAR REGION WITHOUT MYELOPATHY OR RADICULOPATHY: ICD-10-CM

## 2020-01-30 DIAGNOSIS — R52 PAIN: ICD-10-CM

## 2020-01-30 PROCEDURE — 77003 FLUOROGUIDE FOR SPINE INJECT: CPT

## 2020-01-30 RX ORDER — ROPIVACAINE HYDROCHLORIDE 5 MG/ML
20 INJECTION, SOLUTION EPIDURAL; INFILTRATION; PERINEURAL ONCE
Status: DISCONTINUED | OUTPATIENT
Start: 2020-01-30 | End: 2020-01-31 | Stop reason: HOSPADM

## 2020-01-30 RX ORDER — SODIUM CHLORIDE 0.9 % (FLUSH) 0.9 %
3-10 SYRINGE (ML) INJECTION AS NEEDED
Status: DISCONTINUED | OUTPATIENT
Start: 2020-01-30 | End: 2020-01-31 | Stop reason: HOSPADM

## 2020-01-30 RX ORDER — LIDOCAINE HYDROCHLORIDE 10 MG/ML
1 INJECTION, SOLUTION INFILTRATION; PERINEURAL ONCE
Status: COMPLETED | OUTPATIENT
Start: 2020-01-30 | End: 2020-01-30

## 2020-01-30 RX ORDER — SODIUM CHLORIDE 0.9 % (FLUSH) 0.9 %
3 SYRINGE (ML) INJECTION EVERY 12 HOURS SCHEDULED
Status: DISCONTINUED | OUTPATIENT
Start: 2020-01-30 | End: 2020-01-31 | Stop reason: HOSPADM

## 2020-01-30 RX ORDER — FENTANYL CITRATE 50 UG/ML
50 INJECTION, SOLUTION INTRAMUSCULAR; INTRAVENOUS AS NEEDED
Status: DISCONTINUED | OUTPATIENT
Start: 2020-01-30 | End: 2020-01-31 | Stop reason: HOSPADM

## 2020-01-30 RX ORDER — LIDOCAINE HYDROCHLORIDE 20 MG/ML
5 INJECTION, SOLUTION INFILTRATION; PERINEURAL ONCE
Status: COMPLETED | OUTPATIENT
Start: 2020-01-30 | End: 2020-01-30

## 2020-01-30 RX ADMIN — LIDOCAINE HYDROCHLORIDE 5 ML: 10 INJECTION, SOLUTION EPIDURAL; INFILTRATION; INTRACAUDAL; PERINEURAL at 12:21

## 2020-01-30 RX ADMIN — LIDOCAINE HYDROCHLORIDE 5 ML: 20 INJECTION, SOLUTION INFILTRATION; PERINEURAL at 12:37

## 2020-01-30 NOTE — ANESTHESIA PROCEDURE NOTES
Left-sided lumbar facet medial branch radiofrequency ablation to cover the levels of L3-L4, L4-L5, and L5-S1.    Pre-sedation assessment completed: 1/30/2020 12:11 PM    Patient reassessed immediately prior to procedure    Patient location during procedure: Pain Clinic  Start time: 1/30/2020 12:23 PM  Stop Time: 1/30/2020 12:43 PM    Reason for block: procedure for pain  Performed by  Anesthesiologist: Som Sewell MD  Preanesthetic Checklist  Completed: patient identified, site marked, surgical consent, pre-op evaluation, timeout performed, IV checked, risks and benefits discussed and monitors and equipment checked  Prep:  Patient position: prone  Sterile barriers:cap, gloves, mask and sterile barrier  Prep: ChloraPrep  Patient monitoring: blood pressure monitoring, continuous pulse oximetry and EKG  Procedure:  Sedation:no  Approach:unilateral left  Guidance:fluoroscopy  Location:lumbar  Interspace: to cover the levels of L3-L4, L4-L5, and L5-S1.  Needle type: Radiofrequency ablation needles with 1 cm active tip.  Needle Gauge:20 G  Aspiration:negative  Medications:  Comment:0.5 cc of 2% lidocaine was injected at each level after positive sensory and negative motor stimulation testing was performed.    Post Assessment  Injection Assessment: negative  Patient Tolerance:comfortable throughout block  Complications:no  Additional Notes  The patient was brought into the procedure room and placed in a prone position and was comfortable.  ChloraPrep was used and allowed to dry appropriately.  Sterile towels were placed around the patient's back.  An ipsilateral oblique and caudal tilt was used on the x-ray machine to visualize targets.  Skin was numbed up with 2 cc of 1% lidocaine at each injection site.  Radiofrequency needles were advanced to the groove between the transverse process and the superior articular process.  This was done at the L3-L4, L4-L5, and L5-S1 levels (designated by the SAP and transverse  process).  Once the needle was seated against the superior margin of the transverse process, where it joins the superior articular process of the facet, cannula was walked off the superior margin of the transverse process and advanced 2 mm to position the active tip along the course of the medial branch nerve.  Sensory testing was positive at each location with a stimulation at 50 Hz at less than 0.5 V.  There was no motor stimulation to the affected myotomes of the lower extremities at 2 Hz up to 2 V.  Impedances were 358, 315, 319, and 345 ohms respectively.  Great care was taken not to move the cannulae.  Each level was anesthetized with 0.5 mL of 2% lidocaine, and lesions were created at 80 °C for 90 seconds.  Needles were removed and bandages were placed per nursing.  At no time during the ablation did she have any pain or symptoms radiating down her buttocks, groin, hip, or leg.  Patient was taken to recovery by nursing and observed appropriately    Post-Op Diagnosis Codes:     * Spondylosis without myelopathy or radiculopathy, lumbar region (M47.816)     * Lumbar stenosis with neurogenic claudication (M48.062)     * DDD (degenerative disc disease), lumbar (M51.36)    The patient was observed in recovery with no evidence of new onset neurological deficits or other problems. All questions were answered. The patient was discharged with appropriate instructions.

## 2020-01-30 NOTE — H&P
CHIEF COMPLAINT:  Low back pain        HISTORY OF PRESENT ILLNESS:  This patient is complaining of low back pain which radiates in a bandlike pattern across her back.    She has had 2 prior successful diagnostic lumbar facet medial branch blocks bilaterally from L3-L4 down through L5-S1.  She states that these afforded her about 75% pain relief that was temporary.  She states that these injections significantly increased activities of daily living.      PAST MEDICAL HISTORY:  Current Outpatient Medications on File Prior to Encounter   Medication Sig Dispense Refill   • atenolol (TENORMIN) 25 MG tablet Take 25 mg by mouth 2 (Two) Times a Day.  0   • atorvastatin (LIPITOR) 20 MG tablet Take 20 mg by mouth Daily.     • Biotin 51582 MCG tablet Take 1 tablet by mouth Daily.     • Cholecalciferol (VITAMIN D3) 2000 units tablet Take  by mouth.     • gabapentin (NEURONTIN) 600 MG tablet Take 600 mg by mouth 3 (Three) Times a Day.     • Multiple Vitamins-Minerals (MULTIVITAMIN WITH MINERALS) tablet tablet Take 1 tablet by mouth Daily.     • Omega-3 Fatty Acids (FISH OIL) 1000 MG capsule capsule Take  by mouth Daily With Breakfast.     • vitamin B-12 (CYANOCOBALAMIN) 1000 MCG tablet Take 1,000 mcg by mouth Daily.       No current facility-administered medications on file prior to encounter.        Past Medical History:   Diagnosis Date   • Atrial fibrillation (CMS/HCC)    • Back pain    • Cataract    • Hyperlipidemia    • Low back pain    • Meningioma (CMS/HCC) 2000   • PAF (paroxysmal atrial fibrillation) (CMS/HCC)    • Seizures (CMS/HCC)    • Spondylosis of lumbar region without myelopathy or radiculopathy 11/12/2019   • Stroke (CMS/HCC)        SOCIAL HISTORY:  Counseled to avoid tobacco     REVIEW OF SYSTEMS:  No pertinent hematologic, infectious, or constitutional symptoms.  Other review of systems non-contributory     PHYSICAL EXAM:  There were no vitals taken for this visit.  Constitutional: Well-developed  well-nourished no acute distress  General: Alert and oriented  Ophtho: EOMI  Airway: Mallampati 2  Cardiac:  Regular rate  Lungs:  Clear to auscultation bilaterally   GI: soft, nontender  Cervical Spine: Noncontributory  Sacroiliac Joints: Noncontributory  Musc: Tenderness to palpation over the lower 3 lumbar facets bilaterally left greater than right  Neuro: Facet loading is positive left greater than right     DIAGNOSIS:  Post-Op Diagnosis Codes:  Post-Op Diagnosis Codes:     * Spondylosis without myelopathy or radiculopathy, lumbar region [M47.816]     * Lumbar stenosis with neurogenic claudication [M48.062]     * DDD (degenerative disc disease), lumbar [M51.36]     PLAN:  -Left-sided Lumbar facet medial branch radio frequency ablation to cover the level(s) of L3-L4, L4-L5, and L5-S1.   - This patient has received good but temporary relief from previous diagnostic lumbar facet medial branch injections.   - Risks were discussed with the patient, including but not limited to: failure of relief, worsening pain, dysesthesia, sensory loss, radicular pain, radiculopathy, tissue, nerve, nerve root or blood vessel damage, bleeding, infection, and abscess formation. Benefits and alternatives were also discussed. No promises were made. The patient voiced understanding.  -  Physical therapy exercises at home should be considered, as tolerated, as prescribed by physical therapy or from the pain clinic handout (given to the patient).  Continuation of these exercises every day, or multiple times per week, even when the patient has good pain relief, was stressed to the patient as a preventative measure to decrease the frequency and severity of future pain episodes.  -  It is recommended that the patient use the least amount of opioid medication possible.     -  Heat and ice to the affected area as tolerated for pain control.  It was discussed that heating pads can cause burns.  -  Daily low impact exercise such as walking or  water exercise was recommended to maintain overall health and aid in weight control.   -  Patient was counseled to abstain from tobacco products.  -  Follow up as needed for subsequent injections.  -We will schedule her to come in and have the right-sided lumbar facet medial branch radiofrequency lesioning at L3-L4, L4-L5, and L5-S1 at her next visit likely in about 2 weeks.    Som Sewell M.D.  [facility], [facility]  Board Certified in Pain Medicine by the American Board of Anesthesiology  Board Certified in Anesthesiology by the American Board of Anesthesiology     Much of this encounter note is an electronic transcription/translation of spoken language to printed text. The electronic translation of spoken language may permit erroneous, or at times, nonsensical words or phrases to be inadvertently transcribed. Although I have reviewed the note for such errors, some may still exist.

## 2020-04-13 ENCOUNTER — APPOINTMENT (OUTPATIENT)
Dept: PAIN MEDICINE | Facility: HOSPITAL | Age: 80
End: 2020-04-13

## 2020-06-12 NOTE — PROGRESS NOTES
Subjective   History of Present Illness: Nai Rutledge is a 79 y.o. female for televisit follow-up after RFA with Dr Sewell at [facility] which was helpful.    Patient was last seen 10.21.19 for low back pain    She has intermittent low back that is mild, she denies leg pain or weakness, no N/T, urinary incontinence or problems with her balance and gait.    She is taking Gabapentin 600 mg TID    You have chosen to receive care through a telephone visit. Do you consent to use a telephone visit for your medical care today? Yes, she is at home    This was a televisit from my office that lasted 8 minutes. The patient was at home. I had seen her 8 months ago and she had predominantly low back pain. She has had previous surgery for stenosis and a herniated disc with left leg pain. She has not had sciatica. The RFA helped. She remains on gabapentin at 600 mg t.i.d., but that was given originally because of the brain surgery that she had and she stayed on it. She feels that she can live with her symptoms. She knows she can get another ablation. If she has a recurrence of sciatica she will let me know, but since she is doing well we can keep it open ended.      Back Pain   The problem occurs constantly. The problem has been gradually improving since onset. The pain is present in the lumbar spine. The pain does not radiate. The pain is at a severity of 3/10. Pertinent negatives include no numbness or weakness.       The following portions of the patient's history were reviewed and updated as appropriate: allergies, current medications, past family history, past medical history, past social history, past surgical history and problem list.    Review of Systems   Constitutional: Negative.    HENT: Negative.    Eyes: Negative.    Respiratory: Negative.    Cardiovascular: Negative.    Gastrointestinal: Negative.    Endocrine: Negative.    Genitourinary: Negative for urgency.   Musculoskeletal: Positive for back pain. Negative for  arthralgias, gait problem, joint swelling and myalgias.   Allergic/Immunologic: Negative.    Neurological: Negative for weakness and numbness.   Hematological: Negative.    Psychiatric/Behavioral: Negative.        Objective             Physical Exam   Deferred  Neurologic Exam   Deferred        Assessment/Plan   Independent Review of Radiographic Studies:      I personally reviewed the images from the following studies.    I reviewed the lumbar MRI done 6/29/2018 which shows spinal stenosis at L for L5 and L3-L4 and some postoperative changes on the left at L5-S1.  Agree with the report.    Medical Decision Making:      She feels she can tolerate her symptoms as they stand so we can keep it open-ended.  If her symptoms worsen she can always have another ablation.  If she develops sciatica, and she knows what that feels like, that she can certainly come back to see us again.      There are no diagnoses linked to this encounter.  No follow-ups on file.

## 2020-06-22 ENCOUNTER — OFFICE VISIT (OUTPATIENT)
Dept: NEUROSURGERY | Facility: CLINIC | Age: 80
End: 2020-06-22

## 2020-06-22 DIAGNOSIS — M48.062 SPINAL STENOSIS OF LUMBAR REGION WITH NEUROGENIC CLAUDICATION: ICD-10-CM

## 2020-06-22 DIAGNOSIS — M51.36 DDD (DEGENERATIVE DISC DISEASE), LUMBAR: Primary | ICD-10-CM

## 2020-06-22 PROCEDURE — 99441 PR PHYS/QHP TELEPHONE EVALUATION 5-10 MIN: CPT | Performed by: NEUROLOGICAL SURGERY

## 2020-08-12 ENCOUNTER — TRANSCRIBE ORDERS (OUTPATIENT)
Dept: ADMINISTRATIVE | Facility: HOSPITAL | Age: 80
End: 2020-08-12

## 2020-08-12 DIAGNOSIS — D32.9 MENINGIOMA (HCC): Primary | ICD-10-CM

## 2020-08-17 ENCOUNTER — OFFICE VISIT (OUTPATIENT)
Dept: CARDIOLOGY | Facility: CLINIC | Age: 80
End: 2020-08-17

## 2020-08-17 VITALS
HEART RATE: 61 BPM | HEIGHT: 64 IN | WEIGHT: 154 LBS | BODY MASS INDEX: 26.29 KG/M2 | DIASTOLIC BLOOD PRESSURE: 80 MMHG | SYSTOLIC BLOOD PRESSURE: 112 MMHG

## 2020-08-17 DIAGNOSIS — I48.0 PAROXYSMAL ATRIAL FIBRILLATION (HCC): Primary | ICD-10-CM

## 2020-08-17 PROCEDURE — 93000 ELECTROCARDIOGRAM COMPLETE: CPT | Performed by: INTERNAL MEDICINE

## 2020-08-17 PROCEDURE — 99214 OFFICE O/P EST MOD 30 MIN: CPT | Performed by: INTERNAL MEDICINE

## 2020-08-17 NOTE — PROGRESS NOTES
Date of Office Visit: 2020  Encounter Provider: Tree Marques MD  Place of Service: [facility] CARDIOLOGY  Patient Name: Nai Rutledge  : 1940    Subjective:     Encounter Date:2020      Patient ID: Nai Rutledge is a 80 y.o. female who has a cc of  Minimally asymptomatic short-duration PAF (less than 1%)  for which we put her on atenolol.    She feels fine. Occ to rarely she feels transient palp described as skips and irreg in her left chest. (none of these recent)     She also feels slow beating.     She has history of meningioma and stroke after surgery 20 years with minimal residual defects./ Mostly of the right upper extremity.      The patient had a good year.   No anginal chest pain,   No sig rico,   No soa,   No fainting,  No orthostasis.   No edema.   Exercise tolerance: she does housework. She was doing water exercises. She has spinal stenosis and that slows her down.     She has been on oral AC and had some hemoptysis.         There have been no hospital admission since the last visit.         Past Medical History:   Diagnosis Date   • Atrial fibrillation (CMS/HCC)    • Back pain    • Cataract    • Dermatitis     left hand rash. Patient was seen on 2020 by dermatologist   • Hyperlipidemia    • Low back pain    • Meningioma (CMS/HCC)    • PAF (paroxysmal atrial fibrillation) (CMS/HCC)    • Seizures (CMS/HCC)    • Spondylosis of lumbar region without myelopathy or radiculopathy 2019   • Stroke (CMS/HCC)        Social History     Socioeconomic History   • Marital status:      Spouse name: Not on file   • Number of children: 2   • Years of education: BA   • Highest education level: Not on file   Occupational History   • Occupation: RETIRED   Tobacco Use   • Smoking status: Never Smoker   • Smokeless tobacco: Never Used   Substance and Sexual Activity   • Alcohol use: Yes     Alcohol/week: 10.0 standard drinks     Types: 10 Glasses of wine  "per week   • Drug use: No   • Sexual activity: Defer   Social History Narrative    LIVES WITH SPOUSE       Review of Systems   Constitution: Negative for fever and night sweats.   HENT: Negative for ear pain and stridor.    Eyes: Negative for discharge and visual halos.   Cardiovascular: Negative for cyanosis.   Respiratory: Negative for hemoptysis and sputum production.    Hematologic/Lymphatic: Negative for adenopathy.   Skin: Negative for nail changes and unusual hair distribution.   Musculoskeletal: Positive for arthritis, back pain and muscle weakness. Negative for gout and joint swelling.   Gastrointestinal: Negative for bowel incontinence and flatus.   Genitourinary: Negative for dysuria and flank pain.   Neurological: Negative for seizures and tremors.   Psychiatric/Behavioral: Negative for altered mental status. The patient is not nervous/anxious.             Objective:     Vitals:    08/17/20 0950   BP: 112/80   BP Location: Right arm   Patient Position: Sitting   Cuff Size: Large Adult   Pulse: 61   Weight: 69.9 kg (154 lb)   Height: 162.6 cm (64\")         Physical Exam   Constitutional: She is oriented to person, place, and time.   HENT:   Head: Normocephalic and atraumatic.   Eyes: Right eye exhibits no discharge. Left eye exhibits no discharge.   Neck: No JVD present. No thyromegaly present.   Cardiovascular: Normal rate and regular rhythm. Exam reveals no gallop and no friction rub.   No murmur heard.  Pulmonary/Chest: Effort normal and breath sounds normal. She has no rales.   Abdominal: Soft. Bowel sounds are normal. There is no tenderness.   Musculoskeletal: Normal range of motion. She exhibits no edema or deformity.   Neurological: She is alert and oriented to person, place, and time. She exhibits normal muscle tone.   Skin: Skin is warm and dry. No erythema.   Psychiatric: She has a normal mood and affect. Her behavior is normal. Thought content normal.         ECG 12 Lead  Date/Time: 8/17/2020 " 10:18 AM  Performed by: Tree Marques MD  Authorized by: Tree Marques MD   Comparison: compared with previous ECG   Similar to previous ECG  Rhythm: sinus rhythm  Rate: normal  Conduction: conduction normal  ST Segments: ST segments normal  T Waves: T waves normal  QRS axis: normal    Clinical impression: normal ECG            Lab Review:       Assessment:          Diagnosis Plan   1. Paroxysmal atrial fibrillation (CMS/HCC)            Plan:     Not on oral AC because of minimal duration AF and mild bleeding on oral AC      I would like to check a ZIo patch to see if she is having any AF that she does not feel b/c if she was having AF then we would want to use AC    Otherwise her exam and ecg are normal.     The atenolol seems to be controlling symptoms.-- she does have some laina but is asymptomatic -- another reason to check this monitor.

## 2020-09-02 ENCOUNTER — HOSPITAL ENCOUNTER (OUTPATIENT)
Dept: MRI IMAGING | Facility: HOSPITAL | Age: 80
Discharge: HOME | End: 2020-09-02
Admitting: INTERNAL MEDICINE

## 2020-09-02 DIAGNOSIS — D32.9 MENINGIOMA (HCC): ICD-10-CM

## 2020-09-02 PROCEDURE — 82565 ASSAY OF CREATININE: CPT

## 2020-09-02 PROCEDURE — 70553 MRI BRAIN STEM W/O & W/DYE: CPT

## 2020-09-02 PROCEDURE — A9577 INJ MULTIHANCE: HCPCS | Performed by: INTERNAL MEDICINE

## 2020-09-02 PROCEDURE — 0 GADOBENATE DIMEGLUMINE 529 MG/ML SOLUTION: Performed by: INTERNAL MEDICINE

## 2020-09-02 RX ADMIN — GADOBENATE DIMEGLUMINE 14 ML: 529 INJECTION, SOLUTION INTRAVENOUS at 10:22

## 2020-09-03 LAB — CREAT BLDA-MCNC: 0.8 MG/DL (ref 0.6–1.3)

## 2020-09-09 ENCOUNTER — APPOINTMENT (OUTPATIENT)
Dept: WOMENS IMAGING | Facility: HOSPITAL | Age: 80
End: 2020-09-09

## 2020-09-09 PROCEDURE — 77067 SCR MAMMO BI INCL CAD: CPT | Performed by: RADIOLOGY

## 2020-09-09 PROCEDURE — 77063 BREAST TOMOSYNTHESIS BI: CPT | Performed by: RADIOLOGY

## 2020-09-22 ENCOUNTER — TELEPHONE (OUTPATIENT)
Dept: CARDIOLOGY | Facility: CLINIC | Age: 80
End: 2020-09-22

## 2021-05-19 PROCEDURE — 99283 EMERGENCY DEPT VISIT LOW MDM: CPT

## 2021-05-20 ENCOUNTER — APPOINTMENT (OUTPATIENT)
Dept: CT IMAGING | Facility: HOSPITAL | Age: 81
End: 2021-05-20

## 2021-05-20 ENCOUNTER — HOSPITAL ENCOUNTER (EMERGENCY)
Facility: HOSPITAL | Age: 81
Discharge: HOME | End: 2021-05-20
Attending: EMERGENCY MEDICINE | Admitting: EMERGENCY MEDICINE

## 2021-05-20 ENCOUNTER — APPOINTMENT (OUTPATIENT)
Dept: GENERAL RADIOLOGY | Facility: HOSPITAL | Age: 81
End: 2021-05-20

## 2021-05-20 VITALS
TEMPERATURE: 97.9 F | BODY MASS INDEX: 26.43 KG/M2 | DIASTOLIC BLOOD PRESSURE: 85 MMHG | HEART RATE: 58 BPM | OXYGEN SATURATION: 97 % | SYSTOLIC BLOOD PRESSURE: 129 MMHG | HEIGHT: 64 IN | RESPIRATION RATE: 16 BRPM

## 2021-05-20 DIAGNOSIS — S20.211A RIB CONTUSION, RIGHT, INITIAL ENCOUNTER: Primary | ICD-10-CM

## 2021-05-20 DIAGNOSIS — S09.90XA CLOSED HEAD INJURY, INITIAL ENCOUNTER: ICD-10-CM

## 2021-05-20 PROCEDURE — 70450 CT HEAD/BRAIN W/O DYE: CPT

## 2021-05-20 PROCEDURE — 71101 X-RAY EXAM UNILAT RIBS/CHEST: CPT

## 2021-08-20 ENCOUNTER — OFFICE VISIT (OUTPATIENT)
Dept: CARDIOLOGY | Facility: CLINIC | Age: 81
End: 2021-08-20

## 2021-08-20 VITALS
WEIGHT: 153 LBS | SYSTOLIC BLOOD PRESSURE: 132 MMHG | DIASTOLIC BLOOD PRESSURE: 84 MMHG | BODY MASS INDEX: 26.12 KG/M2 | HEIGHT: 64 IN | HEART RATE: 57 BPM

## 2021-08-20 DIAGNOSIS — I48.0 PAROXYSMAL ATRIAL FIBRILLATION (HCC): Primary | ICD-10-CM

## 2021-08-20 PROCEDURE — 99213 OFFICE O/P EST LOW 20 MIN: CPT | Performed by: NURSE PRACTITIONER

## 2021-08-20 PROCEDURE — 93000 ELECTROCARDIOGRAM COMPLETE: CPT | Performed by: NURSE PRACTITIONER

## 2021-08-20 NOTE — PROGRESS NOTES
Date of Office Visit: 2021  Encounter Provider: EARL Chun  Place of Service: [facility] CARDIOLOGY  Patient Name: Nai Rutledge  :1940    Chief Complaint   Patient presents with   • Atrial Fibrillation   :     HPI: Nai Rutledge is a 81 y.o. female who is a patient followed by Dr. Marques for paroxysmal atrial fib--minimally symptomatic and short in duration, monitor showed less than 1%, she has been treated with atenolol.    She also has a history of a meningioma and stroke after surgery over 20 years ago with minimal residual effects (right upper extremity weakness).     She presents today for annual follow-up.    When she saw him last year he did a ZIO and she had some very brief episodes of nonspecific AT but no A. fib.  She has not been on tach coagulation due to a history of blood-tinged sputum on it in the past and she had a really low burden of AF to begin with.  Since her ZIO did not show any AF she has not been on an anticoagulant.    She is doing well. She has occasional palpitations but has not had any episodes of AF that she is aware of.     She denies chest pain, dyspnea, PND, orthopnea, dizziness or edema.        Past Medical History:   Diagnosis Date   • Atrial fibrillation (CMS/HCC)    • Back pain    • Cataract    • Closed head injury    • Dermatitis     left hand rash. 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   • Stroke (CMS/HCC)        Past Surgical History:   Procedure Laterality Date   • BRAIN SURGERY      MENINGIOMA, BLEEDING, SEIZURE, STOKE   • BREAST SURGERY     • CATARACT EXTRACTION       AND    • EPIDURAL BLOCK     • EYE SURGERY Right     MACULAR HOLE  IN RETINA       Social History     Socioeconomic History   • Marital status:      Spouse name: Not on file   • Number of children: 2   • Years of education: BA   • Highest education level: Not on file   Tobacco Use   • Smoking status: Never Smoker   • Smokeless tobacco: Never Used   Vaping Use   • Vaping Use: Never used   Substance and Sexual Activity   • Alcohol use: Yes     Alcohol/week: 10.0 standard drinks     Types: 10 Glasses of wine per week   • Drug use: No   • Sexual activity: Defer       Family History   Problem Relation Age of Onset   • No Known Problems Mother    • Heart disease Father    • Lung cancer Sister        Review of Systems   Constitutional: Negative for chills, fever and malaise/fatigue.   Cardiovascular: Negative for chest pain, dyspnea on exertion, leg swelling, near-syncope, orthopnea, palpitations, paroxysmal nocturnal dyspnea and syncope.   Respiratory: Negative for cough and shortness of breath.    Hematologic/Lymphatic: Negative.    Musculoskeletal: Negative for joint pain, joint swelling and myalgias.   Gastrointestinal: Negative for abdominal pain, diarrhea, melena, nausea and vomiting.   Genitourinary: Negative for frequency and hematuria.   Neurological: Negative for light-headedness, numbness, paresthesias and seizures.   Allergic/Immunologic: Negative.    All other systems reviewed and are negative.      Allergies   Allergen Reactions   • Nickel Other (See Comments)     Showed up as allergic on allergy testing   • Iodine Rash   • Sulfa Antibiotics Rash         Current Outpatient Medications:   •  atenolol (TENORMIN) 25 MG tablet, Take 25 mg by mouth 2 (Two) Times a Day., Disp: , Rfl: 0  •  atorvastatin (LIPITOR) 20 MG tablet, Take 20 mg by mouth Daily., Disp: , Rfl:   •  Biotin 58131 MCG tablet, Take 1 tablet by mouth Daily., Disp: , Rfl:   •  Cholecalciferol (VITAMIN D3) 2000 units tablet, Take  by mouth., Disp: , Rfl:   •  gabapentin (NEURONTIN) 600 MG tablet, Take 600 mg  "by mouth 3 (Three) Times a Day., Disp: , Rfl:   •  Multiple Vitamins-Minerals (MULTIVITAMIN WITH MINERALS) tablet tablet, Take 1 tablet by mouth Daily., Disp: , Rfl:   •  Omega-3 Fatty Acids (FISH OIL) 1000 MG capsule capsule, Take  by mouth Daily With Breakfast., Disp: , Rfl:   •  vitamin B-12 (CYANOCOBALAMIN) 1000 MCG tablet, Take 1,000 mcg by mouth Daily., Disp: , Rfl:       Objective:     Vitals:    08/20/21 0945   BP: 132/84   Pulse: 57   Weight: 69.4 kg (153 lb)   Height: 162.6 cm (64\")     Body mass index is 26.26 kg/m².    PHYSICAL EXAM:    Vitals Reviewed.   General Appearance: No acute distress, well developed and well nourished.   Eyes: Conjunctiva and lids: No erythema, swelling, or discharge. Sclera non-icteric.   HENT: Atraumatic, normocephalic. External eyes, ears, and nose normal.   Respiratory: No signs of respiratory distress. Respiration rhythm and depth normal.   Clear to auscultation. No rales, crackles, rhonchi, or wheezing auscultated.   Cardiovascular:  Jugular Venous Pressure: Normal  Heart Rate and Rhythm: Normal, Heart Sounds: Normal S1 and S2. No S3 or S4 noted.  Murmurs: No murmurs noted. No rubs, thrills, or gallops.   Arterial Pulses:  Posterior tibialis and dorsalis pedis pulses normal.   Lower Extremities: No edema noted.  Gastrointestinal:  Abdomen soft, non-distended, non-tender.   Musculoskeletal: Moves all extremities, RUE weakness.   Skin and Nails: General appearance normal. No pallor, cyanosis, diaphoresis. Skin temperature normal. No clubbing of fingernails.   Psychiatric: Patient alert and oriented to person, place, and time. Speech and behavior appropriate. Normal mood and affect.       ECG 12 Lead    Date/Time: 8/20/2021 9:38 AM  Performed by: Ro Dudley APRN  Authorized by: Ro Dudley APRN   Comparison: compared with previous ECG   Similar to previous ECG  Rhythm: sinus rhythm  BPM: 57                Assessment:       Diagnosis Plan   1. Paroxysmal atrial " fibrillation (CMS/HCC)  ECG 12 Lead          Plan:       1. PAF---no episodes in the last year, occasional palpitations, Zio last year, no AF, on low dose atenolol. No AC secondary to hx blood tinged sputum in the past when on NOAC and no AF, continue to monitor. Instructed to call if she developed episodes.     Follow up with Dr. Marques in 1 year.     As always, it has been a pleasure to participate in your patient's care.      Sincerely,         EARL Schafer

## 2021-09-14 ENCOUNTER — APPOINTMENT (OUTPATIENT)
Dept: WOMENS IMAGING | Facility: HOSPITAL | Age: 81
End: 2021-09-14

## 2021-09-14 PROCEDURE — 77063 BREAST TOMOSYNTHESIS BI: CPT | Performed by: RADIOLOGY

## 2021-09-14 PROCEDURE — 77067 SCR MAMMO BI INCL CAD: CPT | Performed by: RADIOLOGY

## 2021-09-18 ENCOUNTER — IMMUNIZATION (OUTPATIENT)
Dept: VACCINE CLINIC | Facility: HOSPITAL | Age: 81
End: 2021-09-18

## 2021-09-18 PROCEDURE — 91300 HC SARSCOV02 VAC 30MCG/0.3ML IM: CPT | Performed by: INTERNAL MEDICINE

## 2021-09-18 PROCEDURE — 0001A: CPT | Performed by: INTERNAL MEDICINE

## 2022-02-10 ENCOUNTER — TRANSCRIBE ORDERS (OUTPATIENT)
Dept: ADMINISTRATIVE | Facility: HOSPITAL | Age: 82
End: 2022-02-10

## 2022-02-10 DIAGNOSIS — D32.9 MENINGIOMA: Primary | ICD-10-CM

## 2022-03-29 ENCOUNTER — HOSPITAL ENCOUNTER (OUTPATIENT)
Dept: MRI IMAGING | Facility: HOSPITAL | Age: 82
Discharge: HOME | End: 2022-03-29
Admitting: INTERNAL MEDICINE

## 2022-03-29 DIAGNOSIS — D32.9 MENINGIOMA: ICD-10-CM

## 2022-03-29 PROCEDURE — 82565 ASSAY OF CREATININE: CPT

## 2022-03-29 PROCEDURE — A9577 INJ MULTIHANCE: HCPCS | Performed by: INTERNAL MEDICINE

## 2022-03-29 PROCEDURE — 0 GADOBENATE DIMEGLUMINE 529 MG/ML SOLUTION: Performed by: INTERNAL MEDICINE

## 2022-03-29 PROCEDURE — 70553 MRI BRAIN STEM W/O & W/DYE: CPT

## 2022-03-29 RX ADMIN — GADOBENATE DIMEGLUMINE 14 ML: 529 INJECTION, SOLUTION INTRAVENOUS at 10:31

## 2022-03-30 LAB — CREAT BLDA-MCNC: 0.7 MG/DL (ref 0.6–1.3)

## 2022-09-20 ENCOUNTER — APPOINTMENT (OUTPATIENT)
Dept: WOMENS IMAGING | Facility: HOSPITAL | Age: 82
End: 2022-09-20

## 2022-09-20 PROCEDURE — 77067 SCR MAMMO BI INCL CAD: CPT | Performed by: RADIOLOGY

## 2022-09-20 PROCEDURE — 77063 BREAST TOMOSYNTHESIS BI: CPT | Performed by: RADIOLOGY

## 2022-10-31 ENCOUNTER — OFFICE VISIT (OUTPATIENT)
Dept: CARDIOLOGY | Facility: CLINIC | Age: 82
End: 2022-10-31

## 2022-10-31 VITALS
DIASTOLIC BLOOD PRESSURE: 86 MMHG | SYSTOLIC BLOOD PRESSURE: 132 MMHG | HEIGHT: 64 IN | BODY MASS INDEX: 23.05 KG/M2 | WEIGHT: 135 LBS | HEART RATE: 61 BPM

## 2022-10-31 DIAGNOSIS — I48.0 PAROXYSMAL ATRIAL FIBRILLATION: Primary | ICD-10-CM

## 2022-10-31 PROCEDURE — 93000 ELECTROCARDIOGRAM COMPLETE: CPT | Performed by: INTERNAL MEDICINE

## 2022-10-31 PROCEDURE — 99214 OFFICE O/P EST MOD 30 MIN: CPT | Performed by: INTERNAL MEDICINE

## 2022-10-31 RX ORDER — TRIAMCINOLONE ACETONIDE 1 MG/G
CREAM TOPICAL AS NEEDED
COMMUNITY
Start: 2022-09-27

## 2022-10-31 NOTE — PROGRESS NOTES
Date of Office Visit: 10/31/2022  Encounter Provider: Tree Marques MD  Place of Service: [facility] CARDIOLOGY  Patient Name: Nai Rutledge  : 1940    Subjective:     Encounter Date:10/31/2022      Patient ID: Nai Rutledge is a 82 y.o. female who has a cc of  Maybe atrial arrhthymias -- maybe AF but none documented in .     Rare palp -- history of a stroke but  This was due to meningioma surgery     The patient had a good year.   No anginal chest pain,   No sig rico,   No soa,   No fainting,  No orthostasis.   No edema.   Exercise tolerance: limited by knee pain -- uses a walker.     There have been no hospital admission since the last visit.     There have been no bleeding events.       Past Medical History:   Diagnosis Date   • Atrial fibrillation (HCC)    • Back pain    • Cataract    • Closed head injury    • Dermatitis     left hand rash. 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   • Stroke (HCC)        Social History     Socioeconomic History   • Marital status:    • Number of children: 2   • Years of education: BA   Tobacco Use   • Smoking status: Never   • Smokeless tobacco: Never   Vaping Use   • Vaping Use: Never used   Substance and Sexual Activity   • Alcohol use: Yes     Alcohol/week: 10.0 standard drinks     Types: 10 Glasses of wine per week   • Drug use: No   • Sexual activity: Defer       Family History   Problem Relation Age of Onset   • No Known Problems Mother    • Heart disease Father    • Lung cancer Sister        Review of Systems   Constitutional: Negative for fever and night sweats.   HENT: Negative for ear pain and stridor.    Eyes: Negative for discharge and visual halos.   Cardiovascular:  "Negative for cyanosis.   Respiratory: Negative for hemoptysis and sputum production.    Hematologic/Lymphatic: Negative for adenopathy.   Skin: Negative for nail changes and unusual hair distribution.   Musculoskeletal: Positive for arthritis and joint pain. Negative for gout and joint swelling.   Gastrointestinal: Negative for bowel incontinence and flatus.   Genitourinary: Negative for dysuria and flank pain.   Neurological: Negative for seizures and tremors.   Psychiatric/Behavioral: Negative for altered mental status. The patient is not nervous/anxious.             Objective:     Vitals:    10/31/22 1127   BP: 132/86   Pulse: 61   Weight: 61.2 kg (135 lb)   Height: 162.6 cm (64\")         Eyes:      General:         Right eye: No discharge.         Left eye: No discharge.   HENT:      Head: Normocephalic and atraumatic.   Neck:      Thyroid: No thyromegaly.      Vascular: No JVD.   Pulmonary:      Effort: Pulmonary effort is normal.      Breath sounds: Normal breath sounds. No rales.   Cardiovascular:      Normal rate. Regular rhythm.      No gallop.   Edema:     Peripheral edema absent.   Abdominal:      General: Bowel sounds are normal.      Palpations: Abdomen is soft.      Tenderness: There is no abdominal tenderness.   Musculoskeletal: Normal range of motion.         General: No deformity. Skin:     General: Skin is warm and dry.      Findings: No erythema.   Neurological:      Mental Status: Alert and oriented to person, place, and time.      Motor: Normal muscle tone.   Psychiatric:         Behavior: Behavior normal.         Thought Content: Thought content normal.           ECG 12 Lead    Date/Time: 10/31/2022 12:05 PM  Performed by: Tree Marques MD  Authorized by: Tree Marques MD   Comparison: compared with previous ECG   Similar to previous ECG  Rhythm: sinus rhythm            Lab Review:       Assessment:          Diagnosis Plan   1. Paroxysmal atrial fibrillation (HCC)               Plan:     Af " -- I am not sure she even has AF. She has minimal symptoms. No AC given b/c no AF. And risk of bleeding.     She has a bad knee and inquired about surgery -- she has no card contra-indications

## 2023-11-06 ENCOUNTER — OFFICE VISIT (OUTPATIENT)
Age: 83
End: 2023-11-06
Payer: MEDICARE

## 2023-11-06 VITALS
HEART RATE: 56 BPM | BODY MASS INDEX: 22.53 KG/M2 | HEIGHT: 64 IN | WEIGHT: 132 LBS | SYSTOLIC BLOOD PRESSURE: 124 MMHG | DIASTOLIC BLOOD PRESSURE: 72 MMHG

## 2023-11-06 DIAGNOSIS — I48.0 PAROXYSMAL ATRIAL FIBRILLATION: Primary | ICD-10-CM

## 2023-11-06 PROCEDURE — 99213 OFFICE O/P EST LOW 20 MIN: CPT | Performed by: NURSE PRACTITIONER

## 2023-11-06 PROCEDURE — 1160F RVW MEDS BY RX/DR IN RCRD: CPT | Performed by: NURSE PRACTITIONER

## 2023-11-06 PROCEDURE — 1159F MED LIST DOCD IN RCRD: CPT | Performed by: NURSE PRACTITIONER

## 2023-11-06 PROCEDURE — 93000 ELECTROCARDIOGRAM COMPLETE: CPT | Performed by: NURSE PRACTITIONER

## 2023-11-06 RX ORDER — MIRABEGRON 50 MG/1
50 TABLET, FILM COATED, EXTENDED RELEASE ORAL DAILY
COMMUNITY

## 2023-11-06 NOTE — PROGRESS NOTES
Date of Office Visit: 2023  Encounter Provider: EARL Chun  Place of Service: [facility] CARDIOLOGY  Patient Name: Nai Rutledge  :1940    Chief Complaint   Patient presents with    paroxysmal AFIB   :     HPI: Nai Rutledge is a 83 y.o. female who follows with Dr. Marques for PAF---minimally symptomatic and short in duration, monitor in  showed no AF, just some short runs of non-specific AT.     She also has history of meningioma and stroke after surgery over 20 yrs ago w/minimal residual effects (right upper extremity weakness).      Presents for follow up.     Doing okay. She has no complaints of chest pain, dyspnea, PND, orthopnea or edema.    Primary complaint, left knee pain---she is probably going to have knee surgery after the first of the year.     She does have occasional palpitations but  nothing sustained.      Past Medical History:   Diagnosis Date    Atrial fibrillation     Back pain     Cataract     Closed head injury     Dermatitis     left hand rash. 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    Stroke        Past Surgical History:   Procedure Laterality Date    BRAIN SURGERY      MENINGIOMA, BLEEDING, SEIZURE, STOKE    BREAST SURGERY      CATARACT EXTRACTION       AND     EPIDURAL BLOCK      EYE SURGERY Right     MACULAR HOLE IN [place]       Social History     Socioeconomic History    Marital status:     Number of children: 2    Years of education: BA   Tobacco Use    Smoking status: Never    Smokeless tobacco: Never   Vaping Use    Vaping Use: Never used   Substance and Sexual Activity    Alcohol use: Yes     Alcohol/week: 10.0 standard drinks of alcohol     Types: 10 Glasses of wine per  week    Drug use: No    Sexual activity: Defer       Family History   Problem Relation Age of Onset    No Known Problems Mother     Heart disease Father     Lung cancer Sister        Review of Systems   Constitutional: Negative for chills, fever and malaise/fatigue.   Cardiovascular:  Positive for palpitations. Negative for chest pain, dyspnea on exertion, leg swelling, near-syncope, orthopnea, paroxysmal nocturnal dyspnea and syncope.   Respiratory:  Negative for cough and shortness of breath.    Musculoskeletal:  Positive for arthritis. Negative for joint pain, joint swelling and myalgias.   Gastrointestinal:  Negative for abdominal pain, diarrhea, melena, nausea and vomiting.   Genitourinary:  Negative for frequency and hematuria.   Neurological:  Negative for light-headedness, numbness, paresthesias and seizures.   Allergic/Immunologic: Negative.    All other systems reviewed and are negative.      Allergies   Allergen Reactions    Nickel Other (See Comments)     Showed up as allergic on allergy testing    Iodine Rash    Sulfa Antibiotics Rash         Current Outpatient Medications:     atenolol (TENORMIN) 25 MG tablet, Take 1 tablet by mouth 2 (Two) Times a Day., Disp: , Rfl: 0    atorvastatin (LIPITOR) 20 MG tablet, Take 1 tablet by mouth Daily., Disp: , Rfl:     Biotin 26207 MCG tablet, Take 1 tablet by mouth Daily., Disp: , Rfl:     Cholecalciferol (VITAMIN D3) 2000 units tablet, Take  by mouth., Disp: , Rfl:     gabapentin (NEURONTIN) 600 MG tablet, Take 1 tablet by mouth 3 (Three) Times a Day., Disp: , Rfl:     Multiple Vitamins-Minerals (MULTIVITAMIN WITH MINERALS) tablet tablet, Take 1 tablet by mouth Daily., Disp: , Rfl:     Myrbetriq 50 MG tablet sustained-release 24 hour 24 hr tablet, Take 50 mg by mouth Daily., Disp: , Rfl:     vitamin B-12 (CYANOCOBALAMIN) 1000 MCG tablet, Take 1 tablet by mouth Daily., Disp: , Rfl:       Objective:     Vitals:    11/06/23 0901   BP: 124/72   Pulse: 56   Weight:  "59.9 kg (132 lb)   Height: 162.6 cm (64\")     Body mass index is 22.66 kg/m².    PHYSICAL EXAM:    Vitals Reviewed.   General Appearance: No acute distress, well developed and well nourished.   Eyes: Conjunctiva and lids: No erythema, swelling, or discharge. Sclera non-icteric.   HENT: Atraumatic, normocephalic. External eyes, ears, and nose normal.   Respiratory: No signs of respiratory distress. Respiration rhythm and depth normal.   Clear to auscultation. No rales, crackles, rhonchi, or wheezing auscultated.   Cardiovascular:  Heart Rate and Rhythm: Normal, Heart Sounds: S1 and S2.   Murmurs: No murmurs noted. No rubs, thrills, or gallops.   Lower Extremities: No edema noted.  Gastrointestinal:  Abdomen soft, non-distended, non-tender.   Musculoskeletal: right upper extremity weakness.   Skin: Warm and dry.   Psychiatric: Patient alert and oriented to person, place, and time. Speech and behavior appropriate. Normal mood and affect.       ECG 12 Lead    Date/Time: 11/6/2023 9:42 AM  Performed by: Ro Dudley APRN    Authorized by: Ro Dudley APRN  Comparison: compared with previous ECG   Similar to previous ECG  Rhythm: sinus rhythm  Ectopy: unifocal PVCs  BPM: 56            Assessment:       Diagnosis Plan   1. Paroxysmal atrial fibrillation               Plan:       PAF--palpitations but per Dr. Marques's note, not sure she even had AF---monitor in 2020 showed no AF. She has some occasional palpitations, nothing sustained.     Follow up with Dr. Marques in 1 year.     As always, it has been a pleasure to participate in your patient's care.      Sincerely,         EARL Schafer  "

## 2023-12-15 ENCOUNTER — TRANSCRIBE ORDERS (OUTPATIENT)
Dept: ADMINISTRATIVE | Facility: HOSPITAL | Age: 83
End: 2023-12-15
Payer: MEDICARE

## 2023-12-15 ENCOUNTER — LAB (OUTPATIENT)
Dept: LAB | Facility: HOSPITAL | Age: 83
End: 2023-12-15
Payer: MEDICARE

## 2023-12-15 DIAGNOSIS — E78.5 HYPERLIPIDEMIA, UNSPECIFIED HYPERLIPIDEMIA TYPE: ICD-10-CM

## 2023-12-15 DIAGNOSIS — E78.5 HYPERLIPIDEMIA, UNSPECIFIED HYPERLIPIDEMIA TYPE: Primary | ICD-10-CM

## 2023-12-15 LAB
ALBUMIN SERPL-MCNC: 4.1 G/DL (ref 3.5–5.2)
ALBUMIN/GLOB SERPL: 1.9 G/DL
ALP SERPL-CCNC: 94 U/L (ref 39–117)
ALT SERPL W P-5'-P-CCNC: 24 U/L (ref 1–33)
ANION GAP SERPL CALCULATED.3IONS-SCNC: 9 MMOL/L (ref 5–15)
AST SERPL-CCNC: 30 U/L (ref 1–32)
BASOPHILS # BLD AUTO: 0.08 10*3/MM3 (ref 0–0.2)
BASOPHILS NFR BLD AUTO: 1.5 % (ref 0–1.5)
BILIRUB SERPL-MCNC: 0.5 MG/DL (ref 0–1.2)
BUN SERPL-MCNC: 23 MG/DL (ref 8–23)
BUN/CREAT SERPL: 25.6 (ref 7–25)
CALCIUM SPEC-SCNC: 10.7 MG/DL (ref 8.6–10.5)
CHLORIDE SERPL-SCNC: 109 MMOL/L (ref 98–107)
CHOLEST SERPL-MCNC: 178 MG/DL (ref 0–200)
CO2 SERPL-SCNC: 21 MMOL/L (ref 22–29)
CREAT SERPL-MCNC: 0.9 MG/DL (ref 0.57–1)
DEPRECATED RDW RBC AUTO: 42.5 FL (ref 37–54)
EGFRCR SERPLBLD CKD-EPI 2021: 64 ML/MIN/1.73
EOSINOPHIL # BLD AUTO: 0.11 10*3/MM3 (ref 0–0.4)
EOSINOPHIL NFR BLD AUTO: 2 % (ref 0.3–6.2)
ERYTHROCYTE [DISTWIDTH] IN BLOOD BY AUTOMATED COUNT: 12.9 % (ref 12.3–15.4)
GLOBULIN UR ELPH-MCNC: 2.2 GM/DL
GLUCOSE SERPL-MCNC: 90 MG/DL (ref 65–99)
HCT VFR BLD AUTO: 41.5 % (ref 34–46.6)
HDLC SERPL-MCNC: 72 MG/DL (ref 40–60)
HGB BLD-MCNC: 13.4 G/DL (ref 12–15.9)
IMM GRANULOCYTES # BLD AUTO: 0.02 10*3/MM3 (ref 0–0.05)
IMM GRANULOCYTES NFR BLD AUTO: 0.4 % (ref 0–0.5)
LDLC SERPL CALC-MCNC: 91 MG/DL (ref 0–100)
LDLC/HDLC SERPL: 1.24 {RATIO}
LYMPHOCYTES # BLD AUTO: 2.03 10*3/MM3 (ref 0.7–3.1)
LYMPHOCYTES NFR BLD AUTO: 37.7 % (ref 19.6–45.3)
MCH RBC QN AUTO: 29.5 PG (ref 26.6–33)
MCHC RBC AUTO-ENTMCNC: 32.3 G/DL (ref 31.5–35.7)
MCV RBC AUTO: 91.2 FL (ref 79–97)
MONOCYTES # BLD AUTO: 0.45 10*3/MM3 (ref 0.1–0.9)
MONOCYTES NFR BLD AUTO: 8.4 % (ref 5–12)
NEUTROPHILS NFR BLD AUTO: 2.69 10*3/MM3 (ref 1.7–7)
NEUTROPHILS NFR BLD AUTO: 50 % (ref 42.7–76)
NRBC BLD AUTO-RTO: 0 /100 WBC (ref 0–0.2)
PLATELET # BLD AUTO: 204 10*3/MM3 (ref 140–450)
PMV BLD AUTO: 9.8 FL (ref 6–12)
POTASSIUM SERPL-SCNC: 5.1 MMOL/L (ref 3.5–5.2)
PROT SERPL-MCNC: 6.3 G/DL (ref 6–8.5)
RBC # BLD AUTO: 4.55 10*6/MM3 (ref 3.77–5.28)
SODIUM SERPL-SCNC: 139 MMOL/L (ref 136–145)
TRIGL SERPL-MCNC: 83 MG/DL (ref 0–150)
VLDLC SERPL-MCNC: 15 MG/DL (ref 5–40)
WBC NRBC COR # BLD AUTO: 5.38 10*3/MM3 (ref 3.4–10.8)

## 2023-12-15 PROCEDURE — 80061 LIPID PANEL: CPT

## 2023-12-15 PROCEDURE — 85025 COMPLETE CBC W/AUTO DIFF WBC: CPT

## 2023-12-15 PROCEDURE — 36415 COLL VENOUS BLD VENIPUNCTURE: CPT

## 2023-12-15 PROCEDURE — 80053 COMPREHEN METABOLIC PANEL: CPT

## 2024-01-12 ENCOUNTER — TRANSCRIBE ORDERS (OUTPATIENT)
Dept: ADMINISTRATIVE | Facility: HOSPITAL | Age: 84
End: 2024-01-12
Payer: MEDICARE

## 2024-01-12 DIAGNOSIS — E04.1 THYROID NODULE: Primary | ICD-10-CM

## 2024-01-17 ENCOUNTER — HOSPITAL ENCOUNTER (OUTPATIENT)
Facility: HOSPITAL | Age: 84
Discharge: HOME | End: 2024-01-17
Admitting: SURGERY
Payer: MEDICARE

## 2024-01-17 DIAGNOSIS — E04.1 THYROID NODULE: ICD-10-CM

## 2024-01-17 PROCEDURE — 76536 US EXAM OF HEAD AND NECK: CPT

## 2024-03-25 ENCOUNTER — APPOINTMENT (OUTPATIENT)
Dept: WOMENS IMAGING | Facility: HOSPITAL | Age: 84
End: 2024-03-25
Payer: MEDICARE

## 2024-03-25 PROCEDURE — 77063 BREAST TOMOSYNTHESIS BI: CPT | Performed by: RADIOLOGY

## 2024-03-25 PROCEDURE — 77067 SCR MAMMO BI INCL CAD: CPT | Performed by: RADIOLOGY

## 2024-09-09 ENCOUNTER — HOSPITAL ENCOUNTER (OUTPATIENT)
Facility: HOSPITAL | Age: 84
Discharge: HOME | End: 2024-09-09
Attending: EMERGENCY MEDICINE | Admitting: EMERGENCY MEDICINE
Payer: MEDICARE

## 2024-09-09 ENCOUNTER — APPOINTMENT (OUTPATIENT)
Dept: GENERAL RADIOLOGY | Facility: HOSPITAL | Age: 84
End: 2024-09-09
Payer: MEDICARE

## 2024-09-09 VITALS
HEART RATE: 77 BPM | TEMPERATURE: 99 F | HEIGHT: 64 IN | DIASTOLIC BLOOD PRESSURE: 59 MMHG | BODY MASS INDEX: 24.07 KG/M2 | RESPIRATION RATE: 17 BRPM | WEIGHT: 141 LBS | SYSTOLIC BLOOD PRESSURE: 115 MMHG | OXYGEN SATURATION: 95 %

## 2024-09-09 DIAGNOSIS — S00.33XA CONTUSION OF NOSE, INITIAL ENCOUNTER: Primary | ICD-10-CM

## 2024-09-09 DIAGNOSIS — U07.1 COVID-19: ICD-10-CM

## 2024-09-09 LAB
FLUAV SUBTYP SPEC NAA+PROBE: NOT DETECTED
FLUBV RNA ISLT QL NAA+PROBE: NOT DETECTED
SARS-COV-2 RNA RESP QL NAA+PROBE: DETECTED

## 2024-09-09 PROCEDURE — 99213 OFFICE O/P EST LOW 20 MIN: CPT | Performed by: NURSE PRACTITIONER

## 2024-09-09 PROCEDURE — G0463 HOSPITAL OUTPT CLINIC VISIT: HCPCS | Performed by: NURSE PRACTITIONER

## 2024-09-09 PROCEDURE — 87636 SARSCOV2 & INF A&B AMP PRB: CPT | Performed by: NURSE PRACTITIONER

## 2024-09-09 PROCEDURE — 70160 X-RAY EXAM OF NASAL BONES: CPT

## 2024-09-09 NOTE — FSED PROVIDER NOTE
EMERGENCY DEPARTMENT ENCOUNTER    Room Number:  06/06  Date seen:  9/9/2024  Time seen: 14:06 EDT  PCP: Miguel Quinones Jr., MD  Historian: patient    Discussed/obtained information from independent historians:     HPI:  Chief complaint: Nasal injury  A complete HPI/ROS/PMH/PSH/SH/FH are unobtainable due to: Not applicable  Context:Nai Rutledge is a 84 y.o. female with h/o DDD, paroxysmal atrial fibrillation who presents to the ED with c/o leaning over to reach for something this morning and hitting her nose.  She presents with acute swelling to the nose and reports that at the time she did have some bleeding from the nose.  She denies any preceding symptoms of lightheadedness or dizziness.  She has no hand or wrist pain.  She is not anticoagulated there was no LOC.  Patient's daughter is also requesting a COVID test due to the Patient's  having COVID.  She denies any shortness of breath, fever, headache or bodyaches.    ALLERGIES  Nickel, Iodine, and Sulfa antibiotics    PAST MEDICAL HISTORY  Active Ambulatory Problems     Diagnosis Date Noted    DDD (degenerative disc disease), lumbar 07/16/2018    Spinal stenosis of lumbar region with neurogenic claudication 07/16/2018    Paroxysmal atrial fibrillation 06/19/2019    Spondylosis of lumbar region without myelopathy or radiculopathy 11/12/2019     Resolved Ambulatory Problems     Diagnosis Date Noted    No Resolved Ambulatory Problems     Past Medical History:   Diagnosis Date    Atrial fibrillation     Back pain     Cataract     Closed head injury     Dermatitis     Hyperlipidemia     Low back pain     Meningioma 2000    PAF (paroxysmal atrial fibrillation)     Rib contusion, right, initial encounter     Seizures     Stroke        PAST SURGICAL HISTORY  Past Surgical History:   Procedure Laterality Date    BRAIN SURGERY  2000    MENINGIOMA, BLEEDING, SEIZURE, STOKE    BREAST SURGERY      CATARACT EXTRACTION      2004 AND 2013    EPIDURAL BLOCK       EYE SURGERY Right 2000    MACULAR HOLE IN RETINA       FAMILY HISTORY  Family History   Problem Relation Age of Onset    No Known Problems Mother     Heart disease Father     Lung cancer Sister        SOCIAL HISTORY  Social History     Socioeconomic History    Marital status:     Number of children: 2    Years of education: BA   Tobacco Use    Smoking status: Never    Smokeless tobacco: Never   Vaping Use    Vaping status: Never Used   Substance and Sexual Activity    Alcohol use: Yes     Alcohol/week: 10.0 standard drinks of alcohol     Types: 10 Glasses of wine per week    Drug use: No    Sexual activity: Defer       REVIEW OF SYSTEMS  Review of Systems    All systems reviewed and negative except for those discussed in HPI.     PHYSICAL EXAM    I have reviewed the triage vital signs and nursing notes.  Vitals:    09/09/24 1404   BP: 115/59   Pulse: 77   Resp: 17   Temp: 99 °F (37.2 °C)   SpO2: 95%     Physical Exam  HENT:      Nose: Nasal deformity and signs of injury present. No septal deviation, nasal tenderness or congestion.      Right Nostril: No septal hematoma.      Left Nostril: No septal hematoma.        Comments: Ecchymosis and mild swelling as indicated in image above.     Mouth/Throat:      Lips: Pink.      Mouth: Mucous membranes are moist.      Comments: No trismus, can open and close mouth fully.  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      Extraocular Movements: Extraocular movements intact.         GENERAL: not distressed  HENT: nares patent.  See above  EYES: no scleral icterus PERRL, EOMI, no orbital tenderness.  NECK: no ROM limitations  CV: regular rhythm, regular rate  RESPIRATORY: normal effort  ABDOMEN: soft  : deferred  MUSCULOSKELETAL: no deformity or bruising or pain to bilateral hands or wrist.  NEURO: alert, moves all extremities, follows commands  SKIN: warm, dry    LAB RESULTS  Recent Results (from the past 24 hour(s))    COVID-19 and FLU A/B PCR, 1 HR TAT - Swab, Nasopharynx    Collection Time: 09/09/24  2:15 PM    Specimen: Nasopharynx; Swab   Result Value Ref Range    COVID19 Detected (C) Not Detected - Ref. Range    Influenza A PCR Not Detected Not Detected    Influenza B PCR Not Detected Not Detected       Ordered the above labs and independently interpreted results.  My findings will be discussed in the ED course or medical decision making section below    RADIOLOGY RESULTS  XR Nasal Bones    Result Date: 9/9/2024  XR NASAL BONES-  INDICATIONS: Trauma.  TECHNIQUE: 3 VIEWS OF THE NASAL BONES  COMPARISON: None available  FINDINGS:  No acute fracture is identified. The orbits appear unremarkable. The paranasal sinuses appear grossly clear. If there is remaining clinical concern, CT could be considered for further evaluation.       As described.    This report was finalized on 9/9/2024 2:48 PM by Dr. Francisco Langston M.D on Workstation: [email or web address]        Ordered the above noted radiological studies.  Independently interpreted by me.  My findings will be discussed in the medical decision section below.     PROGRESS, DATA ANALYSIS, CONSULTS AND MEDICAL DECISION MAKING    Please note that this section constitutes my independent interpretation of clinical data including lab results, radiology, EKG's.  This constitutes my independent professional opinion regarding differential diagnosis and management of this patient.  It may include any factors such as history from outside sources, review of external records, social determinants of health, management of medications, response to those treatments, and discussions with other providers.    ED Course as of 09/09/24 1459   Mon Sep 09, 2024   1453 I viewed nasal bone images in PACS.  My independent interpretation is no acute fracture.  [EW]      ED Course User Index  [EW] Gem Brown APRN     Orders placed during this visit:  Orders Placed This Encounter   Procedures    COVID-19 and  FLU A/B PCR, 1 HR TAT - Swab, Nasopharynx    XR Nasal Bones            Medical Decision Making  Problems Addressed:  Contusion of nose, initial encounter: complicated acute illness or injury  COVID-19: complicated acute illness or injury    Amount and/or Complexity of Data Reviewed  Radiology: ordered.    Patient presents with nasal swelling and contusion after losing her balance and striking the floor.  There was no LOC.  She has a normal mental status.  She is not anticoagulated.  No septal hematoma on exam.  Nasal bone imaging performed and negative for any acute fracture.  Incidentally, she is positive for COVID-19.  Her  has been home ill with this for about the past week.  She denies any shortness of breath and has no tachycardia or hypoxia.  We did have shared decision making regarding Paxlovid and she declined.        DIAGNOSIS  Final diagnoses:   Contusion of nose, initial encounter   COVID-19          Medication List      No changes were made to your prescriptions during this visit.         FOLLOW-UP  Miguel Quinones Jr., MD  [facility] [address]  [phone]    Schedule an appointment as soon as possible for a visit in 2 days  As needed, If symptoms worsen        Latest Documented Vital Signs:  As of 14:59 EDT  BP- 115/59 HR- 77 Temp- 99 °F (37.2 °C) (Oral) O2 sat- 95%    Appropriate PPE utilized throughout this patient encounter to include mask, if indicated, per current protocol. Hand hygiene was performed before donning PPE and after removal when leaving the room.    Please note that portions of this were completed with a voice recognition program.     Note Disclaimer: At [facility], we believe that sharing information builds trust and better relationships. You are receiving this note because you are receiving care at [facility] or recently visited. It is possible you will see health information before a provider has talked with you about it. This kind of  information can be easy to misunderstand. To help you fully understand what it means for your health, we urge you to discuss this note with your provider.

## 2024-09-09 NOTE — DISCHARGE INSTRUCTIONS
"Ice to nose as needed to help with swelling    Please expect some bruising.      Tylenol as needed for pain.    Please read through the information below. This information will provide you with additional instructions for home care.    Please start on a multivitamin if you are not already taking one. The best multivitamin available is the Prenatal Vitamin. It does not matter if you are a male or female. You are capable of taking this supplement. Vitamin C, Calcium, Magnesium and Zinc have shown with limited research data to be beneficial for helping the body fight off infections. [employer] have a single pill with Vitamin C, Zinc, Magnesium plus Vitamin D3. This is the most convient supplement to take with all of the additional key supplements for immune support.    COLD AND FLU     Colds are very common viral illnesses that occur year round, but primarily in winter months. Because there are many viruses that cause colds, people may experience multiple colds per year, or find that their symptoms vary with each infection. The flu is also caused by a virus, but is due to a specific virus called influenza virus. The typical symptoms of the flu are high fever, body aches, fatigue, and headache.     The main treatment for these viral infections is supportive care.     Supportive treatment consists of taking medications to treat the symptoms, so that you are more comfortable while you are recovering from the illness. Colds usually last 7-14 days and the flu about 7 days.     Over-the-counter medications for symptomatic relief may include: Mucinex (maximum 3 days), Sudafed, DayQuil/NyQuil, flonase nasal spray.  If you have history of high blood pressure please use caution with these medications.  Check with pharmacist for recommendations.  Coricidin has brand \"HBP\" which is better for patient's with high blood pressure.       If you tested positive for the flu, Tamiflu and Xofluza are prescription antiviral " "medications that work best if taken in the first 24 to 48 hours. If you have had symptoms longer than 48 hours this medication will not be beneficial. Research shows no decrease in length or severity of illness when Tamiflu is not started within 48 hours. These medications were considered when looking at your illness, symptoms and severity of your illness.     Antibiotics are also not beneficial for a viral illness. Antibiotics only work against bacteria, and not viruses.     Please read through the information below. This information will provide you with additional instructions for home care.    VIRAL RESPIRATORY INFECTIONS CAN CAUSE: SINUSITIS, CHEST COLDS, BRONCHITIS, SORE THROAT, EAR ACHES, EAR INFECTIONS, RESPIRATORY FLU, STOMACH FLU.    -ANTIBIOTICS SHOULD NOT BE USED FOR VIRAL INFECTIONS. OUR BODIES NEED 10-14 DAYS TO \"FIGHT OFF\" VIRAL INFECTIONS.   -TAKE ANTIBIOTICS ONLY IF: RECOMMENDED BY YOUR PROVIDER, YOU ARE GETTING WORSE, YOU ARE NOT GETTING BETTER IN 10-14 DAYS.  -AVOID UNNECESSARY ANTIBIOTICS, THE MORE ANTIBIOTICS PEOPLE USE THE MORE LIKELY THEY ARE TO GET INFECTIONS THAT LAST LONGER AND ARE MORE DIFFICULT TO TREAT  -COLD SYMPTOMS INCLUDE: RUNNY NOSE, NASAL CONGESTION, SNEEZING, SORE THROAT, COUGH AND HEADACHE  -CHILDREN WILL OFTEN RUN A FEVER -102 F  -COLDS OCCUR ALL YEAR ROUND  -CHILDREN WILL TYPICALLY GET 8 COLDS A YEAR    Virus precautions, simple things to do at home to help with illness:     Wash/sanitize common household surfaces with antibacterial wipes.  Especially door knobs, light switches, tablets, remote controls, game consoles, cell phones.     Change bed linens and wash bath towels/washcloths    Frequent handwashing    Cough/sneeze into your sleeve      Within one to three days, the nasal secretions usually become thicker and perhaps yellow or green. This is a normal part of the common cold and not a reason for antibiotics. Symptoms usually go away in 7-10 days. If symptoms do " not resolve in 7-10 days or worsen despite recommended treatment then re-evaluation must be sought as a viral infection can turn into a bacterial infection    Please try OTC therapy for 3-5 days. This should help with your symptoms. It would be best to start on an OTC allergy pill or an OTC medication to treat the symptoms which you have.     Take an Allergy pill for congestion, runny nose, itchy watery eyes, sinus pain or pressure.     Tylenol/Ibuprofen (age appropriate dose) for pain or fever.     Additionally, plenty of rest and fluids are also important.     Below is a list of what to do for common symptoms associated with a cold or the flu. The recommendations below are for adult patients. Please make sure the treatments you use are age appropriate as not all information noted below can be given to children.       1. SORE THROAT Ibuprofen (also called Motrin & Advil) and acetaminophen (APAP or Tylenol ) are the most effective pain relievers for a sore throat.     Adults can take 2-3 ibuprofen every 6 hours with food or Tylenol 1000 mg up to 3 times a day as needed. Additionally, salt water gargles (1 tsp. salt in 1 cup of warm water) and lozenges may provide temporary relief of a sore throat.     For children over the age of two and adults - a spoonful of honey may be beneficial for both sore throat and cough.     For children, please follow the instructions on the package. There are different formularies to consider dosing    Sore throats are caused by many medical conditions including allergies, a virus, sinus issues, strep, and more.  Try to manage your sore throat at home. If over the counter medication has not been beneficial and your symptoms are continuing to worsen we ask you seek evaluation.    A severe sore throat should be evaluated by a practitioner, especially if accompanied by a fever over 100.3.   In talking with patients, most say that spraying with chloraseptic is not helpful.    Salt water  gargles will help a sore throat.  For salt water gargles combine 1 teaspoon salt to 8 ounces of warm water and swish and spit.  This can be done 4-5 times a day with a fresh batch of salt and warm water      2. NASAL CONGESTION The best nasal decongestant for most people is pseudoephedrine (Sudafed or a generic, but NOT Sudafed PE). For adults, the usual dose is 60 mg every 4 to 6 hours. This helps with a runny nose and clogged nasal passages, making it easier to breathe. Because Sudafed can be used to make illegal drugs, it is kept behind the counter at your local drugstore. This means you have to ask for the medication and are required to show your ’s license to buy it. Sudafed may make it difficult to sleep at night, so try to avoid taking any before bed if it bothers you. People with high blood pressure should not take pseudoephedrine, phenylephrine, or Afrin nasal spray as all of these can increase blood pressure and potentially lead to strokes. Do not use Afrin nasal spray longer than 3 days, as it can make nasal congestion worse if used too long.     For children over the age of 6 you may use Delsym from over the counter.    Zyrtec or Claritin is also beneficial for allergy symptoms including nasal congestion    For nasal congestion at night, or if you have high blood pressure and can’t take Sudafed or Afrin, use decongestants that come with an antihistamine (such as Coricidan HBP Cold and Flu or Benadryl). These can sometimes make you sleepy, so be sure to see how your body reacts to the drug before driving or going to work. Saline nasal sprays may also be helpful. Many patients find the Neti-pot saline rinse to be useful in clearing out their sinuses as well.    3. COUGH A good all-purpose cough medicine is Delsym. It contains a long-acting version of dextromethorphan, a 12 hour cough suppressant, to help keep the cough under control. If you feel like you have mucus in the chest as well, you could try  a cough suppressant combined with an expectorant.     Examples of this include Robitussin DM (every 4 to 6 hours) or Mucinex DM (every 12 hours). If the cough is not helped by this or if you have trouble breathing, please see a practitioner.     Zyrtec or Claritin will help with a cough caused by post-nasal drainage or allergy related congestion.    4. FEVER/BODY ACHES - You do not need to be seen at the first sign of a fever. A fever is a sign your immune system is working appropriately. A fever alone does not mean you need to be evaluated. The other symptoms, concerns, and health conditions including the instructions your personal family physician and specialists should be considered. Please follow their instruction. If your fever persists for more than 24 to 48 hours and you are concerned for a significant illness please use common sense and seek evaluation. Ibuprofen and acetaminophen will help control fevers, as well as the aches and pains associated with a viral illness. See the sore throat section for proper doses of these medications. Be sure not to exceed the recommended dose for each medicine, and remember that it’s best to take Advil with food.    *NOTE OF CAUTION: Many over the counter cold preparations also contain Tylenol/acetaminophen. Be sure to check cold remedy labels so that you do not take too much Tylenol by mistake. Maximum is 3,000 mg per 24 hours.     Influenza is a viral illness. Generally this infection has to run it's course. It can take 7-14 days for a viral illness to pass. Please treat your symptoms with over the counter medication. If your test was positive, treatment with Tamiflu or Xofluza was considered. You have to meet specific criteria in order to be eligible for this treatment. If this medication was prescribed for you, please start on it immediately. If your symptoms have been present more than 2-3 days it may not be effective at helping you through your illness.     COVID-19  is another viral infection that we now combate within our society. This infection has shown to cause many health complications. If you were COVID positive there is additional therapies you may take to help with symptoms. These additional therapies were considered during your office visit today. Please seek re-evaluation immediately if you develop concerns for pneumonia or other secondary complication from this infection.    If you have a personal or family history of blood clots (DVT), pulumonary embolism (PE), stroke, or heart attack or are at high risk for any of these complications then please talk with your doctor or nurse practitioner about starting on a baby aspirin (81 mg tablet). These have shown to be beneficial to prevent the above complications from occurring or recurring.    If you develop gastritis (irritation of the stomach) including heartburn or indigestion then over the counter PEPCID may be beneficial. Take this medication as directed on the package.    If you develop loss of taste or smell, peppermint candy or the use of peppermint essential oil in a home diffuser for aromatic purposes may be beneficial. Please remember everything in life has risks and benefits. Neither of these are without risks and you must consider your own health conditions and home situation. Do not diffuse peppermint essential oil in a closed room where pets are unable to escape.    Generally a viral illness is worse the first 3-5 days. Over the counter medication and home therapy will help with your symptoms. Management of your illness at home for a few days will provide us with additional information on how to help you manage your illness. Antibiotics will only be beneficial if your symptoms are caused by a bacteria.     If your symptoms do not improve in 7-10 days or your symptoms become worse despite the recommendations above then please seek re-evaluation.    When this treatment fails or symptoms continue to worsen  despite this treatment, re-evaluation is advised as your viral infection or allergy flare up can turn into a secondary bacterial infection where antibiotics would be recommended    Your diagnosis today is based on the information you provided me during today's office visit. It is important you seek re-evaluation immediately if you develop new symptoms, worsening symptoms, or become concerned something else may be going on. It is important for you to call your family physician to schedule an appointment for further evaluation, treatment and care of your complaints. Sometimes your health concerns are not as straight forward as they may initially seem.    Call your doctor immediately to schedule an appointment for re-evaluation.    Should symptoms worsen despite the treatment provided to you here today, then you must go to the ER especially if you are concerned you have a possible emergent situation.     Return Precautions    Although you are being discharged from the ED today, I encourage you to return for worsening symptoms.  Things can, and do, change such that treatment at home with medication may not be adequate.      Specifically, return for any of the following:    Chest pain, shortness of breath, pain or nausea and vomiting not controlled by medications provided.    Please make a follow up with your Primary Care Provider for a blood pressure recheck.